# Patient Record
Sex: MALE | Race: WHITE | NOT HISPANIC OR LATINO | URBAN - METROPOLITAN AREA
[De-identification: names, ages, dates, MRNs, and addresses within clinical notes are randomized per-mention and may not be internally consistent; named-entity substitution may affect disease eponyms.]

---

## 2017-01-09 ENCOUNTER — OFFICE VISIT - HEALTHEAST (OUTPATIENT)
Dept: VASCULAR SURGERY | Facility: CLINIC | Age: 82
End: 2017-01-09

## 2017-01-09 DIAGNOSIS — I73.9 PAD (PERIPHERAL ARTERY DISEASE) (H): ICD-10-CM

## 2017-01-09 DIAGNOSIS — M79.604 LEG PAIN, BILATERAL: ICD-10-CM

## 2017-01-09 DIAGNOSIS — M79.605 LEG PAIN, BILATERAL: ICD-10-CM

## 2017-01-09 DIAGNOSIS — I89.0 ACQUIRED LYMPHEDEMA OF LEG: ICD-10-CM

## 2017-01-09 DIAGNOSIS — M79.89 SWELLING OF LIMB: ICD-10-CM

## 2017-07-05 ENCOUNTER — PRE VISIT (OUTPATIENT)
Dept: SURGERY | Facility: CLINIC | Age: 82
End: 2017-07-05

## 2017-07-05 NOTE — TELEPHONE ENCOUNTER
1.  Date/reason for appt: 7/18/17- Lesion spotted during colonoscopy     2.  Referring provider: Dr. Livan Galan ?     3.  Call to patient (Yes / No - short description): No -referred by [facility]     4.  Previous care at / records requested from:     1. [facility] - faxed cover sheet

## 2017-07-11 ASSESSMENT — ENCOUNTER SYMPTOMS
BACK PAIN: 1
NIGHT SWEATS: 0
ARTHRALGIAS: 0
FEVER: 0
PANIC: 0
POLYPHAGIA: 0
JOINT SWELLING: 0
CHILLS: 0
FATIGUE: 1
HALLUCINATIONS: 0
MYALGIAS: 0
POLYDIPSIA: 0
DECREASED APPETITE: 0
STIFFNESS: 0
NECK PAIN: 0
MUSCLE CRAMPS: 0
WEIGHT LOSS: 1
MUSCLE WEAKNESS: 0
DECREASED CONCENTRATION: 0
ALTERED TEMPERATURE REGULATION: 0
INCREASED ENERGY: 1
WEIGHT GAIN: 0
NERVOUS/ANXIOUS: 1
INSOMNIA: 0
DEPRESSION: 1

## 2017-07-18 ENCOUNTER — OFFICE VISIT (OUTPATIENT)
Dept: SURGERY | Facility: CLINIC | Age: 82
End: 2017-07-18

## 2017-07-18 VITALS
HEIGHT: 70 IN | OXYGEN SATURATION: 98 % | SYSTOLIC BLOOD PRESSURE: 117 MMHG | WEIGHT: 170.6 LBS | DIASTOLIC BLOOD PRESSURE: 78 MMHG | HEART RATE: 54 BPM | TEMPERATURE: 98.1 F | BODY MASS INDEX: 24.42 KG/M2

## 2017-07-18 DIAGNOSIS — I77.9 PERIPHERAL ARTERIAL OCCLUSIVE DISEASE (H): ICD-10-CM

## 2017-07-18 DIAGNOSIS — Z95.1 HISTORY OF CORONARY ARTERY BYPASS SURGERY: ICD-10-CM

## 2017-07-18 DIAGNOSIS — D12.2 ADENOMA OF ASCENDING COLON: Primary | ICD-10-CM

## 2017-07-18 DIAGNOSIS — I25.10 CHRONIC CORONARY ARTERY DISEASE: ICD-10-CM

## 2017-07-18 RX ORDER — CHLORAL HYDRATE 500 MG
2 CAPSULE ORAL
COMMUNITY

## 2017-07-18 ASSESSMENT — PAIN SCALES - GENERAL: PAINLEVEL: NO PAIN (0)

## 2017-07-18 NOTE — MR AVS SNAPSHOT
After Visit Summary   7/18/2017    Humza Arvizu    MRN: [id number]           Patient Information     Date Of Birth          1935        Visit Information        Provider Department      7/18/2017 10:30 AM Russell Rodrigues MD [facility] Colon and Rectal Surgery        Today's Diagnoses     Adenoma of ascending colon    -  1    History of coronary artery bypass surgery        Peripheral arterial occlusive disease (H)        Chronic coronary artery disease           Follow-ups after your visit        Who to contact     Please call your clinic at [phone] to:    Ask questions about your health    Make or cancel appointments    Discuss your medicines    Learn about your test results    Speak to your doctor   If you have compliments or concerns about an experience at your clinic, or if you wish to file a complaint, please contact [facility] Patient Relations at [phone] or email us at [email or web address]         Additional Information About Your Visit        MyChart Information     [employer] gives you secure access to your electronic health record. If you see a primary care provider, you can also send messages to your care team and make appointments. If you have questions, please call your primary care clinic.  If you do not have a primary care provider, please call [phone] and they will assist you.      "[employer] is an electronic gateway that provides easy, online access to your medical records. With "[employer] you can request a clinic appointment, read your test results, renew a prescription or communicate with your care team.     To access your existing account, please contact your [facility] or call [phone] for assistance.        Care EveryWhere ID     This is your Care EveryWhere ID. This could be used by other organizations to access your [place] medical records  [id number]        Your Vitals Were     Pulse  "Temperature Height Pulse Oximetry BMI (Body Mass Index)       54 98.1  F (36.7  C) (Oral) 1.778 m (5' 10\") 98% 24.48 kg/m2        Blood Pressure from Last 3 Encounters:   No data found for BP    Weight from Last 3 Encounters:   No data found for Wt              Today, you had the following     No orders found for display       Primary Care Provider    None Specified       No primary provider on file.        Equal Access to Services     [facility] : Hadii aad ku hadtirsoo Soomaali, waaxda luqadaha, qaybta kaalmada adeegyada, waxumesh idiin pedriton adesaúl sands ladoryn . So [facility] [phone].    ATENCIÓN: Si habla adrien, tiene a gleason disposición servicios gratuitos de asistencia lingüística. Llame al [phone].    We comply with applicable federal civil rights laws and [place] laws. We do not discriminate on the basis of race, color, national origin, age, disability sex, sexual orientation or gender identity.            Thank you!     Thank you for choosing [facility] COLON AND RECTAL SURGERY  for your care. Our goal is always to provide you with excellent care. Hearing back from our patients is one way we can continue to improve our services. Please take a few minutes to complete the written survey that you may receive in the mail after your visit with us. Thank you!             Your Updated Medication List - Protect others around you: Learn how to safely use, store and throw away your medicines at www.disposemymeds.org.          This list is accurate as of: 7/18/17 11:59 PM.  Always use your most recent med list.                   Brand Name Dispense Instructions for use Diagnosis    ATENOLOL PO      Take 25 mg by mouth    Chronic low back pain, unspecified back pain laterality, with sciatica presence unspecified       co-enzyme Q-10 50 MG capsule      Take 50 mg by mouth        Omega-3 1000 MG Caps      Take 2 g by mouth        PLAVIX PO      Take 75 mg by mouth    Chronic low back pain, unspecified back pain " laterality, with sciatica presence unspecified       Turmeric Curcumin 500 MG Caps      Take 1 tablet by mouth        vitamin D3 1000 UNITS Caps      Take 1,000 Units by mouth

## 2017-07-18 NOTE — PROGRESS NOTES
"Colon and Rectal Surgery Clinic Note      RE: Humza Arvizu  : 1935  STARLA: 2017      Mehdi is an extremely pleasant 82-year-old man seen in consultation for Dr. Cristina Krishnamurthy to discuss possible surgery for an unresectable ascending colon adenoma. This was initially diagnosed on screening colonoscopy. Dr. Krishnamurthy performed 2 attempts at ablation with APC but these were unsuccessful. The polyp site was marked with Rahel ink.    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 In addition, Mehdi underwent CABG ×3 for a \" maker\" lesion that was causing angina. He has been asymptomatic since his bypass.    We denies any bleeding disorders or bowel problems other than mild constipation. He has had a previous shoulder arthroscopy and had no difficulties with anesthesia. He is currently on Plavix and atenolol but is not on aspirin per the advice of his neurologist.    Social history: Mehdi is a retired  from [employer]. He is  but his wife is unfortunately currently hospitalized at [facility] with terminal breast cancer. He is a nonsmoker and very occasional drinker.    Family history: Negative for colorectal cancer. Please father had prostate cancer.    I had a detailed discussion with Mehdi regarding his options moving forward. Given the use of the APC, I doubt that he will be a candidate for endoscopic submucosal dissection, but I will at least get the opinion of our interventional endoscopists about this. We discussed that the traditional therapy for this nonresectable polyp would be a laparoscopic right hemicolectomy. The polyp measures only 11-15 mm in size so I don't think there is extreme urgency to proceed immediately given the status of Mehdi's wife. We discussed the risks associated with surgery in detail, including but not limited to bleeding, infection, deep venous thrombosis/pulmonary embolism, pneumonia, adjacent organ injury, " myocardial infarction, cerebrovascular accident, and death. We specifically discussed the risks of anastomotic failure and the full range of its potential serious consequences. Mehdi and I discussed that his risks for cardiac and cerebrovascular complications would be elevated based on his known atherosclerotic disease. I answered all of Mehdi's questions to his stated satisfaction. He expressed his understanding and agreement with the proposed plan. Given his wife's status, we agreed to meet again in several weeks to months when his home situation is more stable to recovery review these issues. We did discuss the alternative of nonoperative therapy, which is not entirely unreasonable based upon his potential cardiovascular and cerebrovascular morbidity, but overall my guess is that he is in reasonable shape to proceed with surgery and I did not advise nonoperative therapy. We will need to reinterrogate his cerebrovascular and possibly cardiovascular status prior to operation in any case.    Total time 45 minutes. Greater than half the time spent counseling.      For details of past medical history, surgical history, family history, medications, allergies, and review of systems, please see details below.    Medical history:  No past medical history on file.    Surgical history:  No past surgical history on file.    Family history:  No family history on file.    Medications:  Current Outpatient Prescriptions   Medication Sig Dispense Refill     Omega-3 1000 MG CAPS Take 2 g by mouth       Cholecalciferol (VITAMIN D3) 1000 UNITS CAPS Take 1,000 Units by mouth       co-enzyme Q-10 50 MG capsule Take 50 mg by mouth       Turmeric Curcumin 500 MG CAPS Take 1 tablet by mouth       Clopidogrel Bisulfate (PLAVIX PO) Take 75 mg by mouth       ATENOLOL PO Take 25 mg by mouth       Allergies:  The patientis allergic to bee pollen and pollen extract.    Social history:  Social History   Substance Use Topics     Smoking status:  "Never Smoker     Smokeless tobacco: Not on file     Alcohol use Not on file     Marital status: .    Review of Systems:  Nursing Notes:   Lucy Chen LPN  7/18/2017 10:37 AM  Signed  Chief Complaint   Patient presents with     Clinic Care Coordination - Initial     New pt consult to discuss lesion found on colonoscopy.        Vitals:    07/18/17 1034   BP: 117/78   BP Location: Left arm   Patient Position: Chair   Cuff Size: Adult Regular   Pulse: 54   Temp: 98.1  F (36.7  C)   TempSrc: Oral   SpO2: 98%   Weight: 170 lb 9.6 oz   Height: 5' 10\"       Body mass index is 24.48 kg/(m^2).  FORREST Braun MD   Professor and Chief  Division of Colon and Rectal Surgery  [facility]      Referring Provider:  Referred Self, MD  No address on file     Primary Care Provider:  No primary care provider on file.  "

## 2017-07-18 NOTE — NURSING NOTE
"Chief Complaint   Patient presents with     Clinic Care Coordination - Initial     New pt consult to discuss lesion found on colonoscopy.        Vitals:    07/18/17 1034   BP: 117/78   BP Location: Left arm   Patient Position: Chair   Cuff Size: Adult Regular   Pulse: 54   Temp: 98.1  F (36.7  C)   TempSrc: Oral   SpO2: 98%   Weight: 170 lb 9.6 oz   Height: 5' 10\"       Body mass index is 24.48 kg/(m^2).  Sulma DEMPSEY, GERBERN                        "

## 2017-07-18 NOTE — LETTER
"2017       RE: Humza Arvizu  [address] [id number]     Dear Mirian,    Thank you for referring your patient, Humza Arvizu, to the [facility] COLON AND RECTAL SURGERY at [facility]. Please see a copy of my visit note below.    Ricky        Colon and Rectal Surgery Clinic Note      RE: Humza Arvizu  : 1935  STARLA: 2017      Mehdi is an extremely pleasant 82-year-old man seen in consultation for Dr. Cristina Krishnamurthy to discuss possible surgery for an unresectable ascending colon adenoma. This was initially diagnosed on screening colonoscopy. Dr. Krishnamurthy performed 2 attempts at ablation with APC but these were unsuccessful. The polyp site was marked with Rahel ink.    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 In addition, Mehdi underwent CABG ×3 for a \" maker\" lesion that was causing angina. He has been asymptomatic since his bypass.    We denies any bleeding disorders or bowel problems other than mild constipation. He has had a previous shoulder arthroscopy and had no difficulties with anesthesia. He is currently on Plavix and atenolol but is not on aspirin per the advice of his neurologist.    Social history: Mehdi is a retired  from [employer]. He is  but his wife is unfortunately currently hospitalized at [facility] with terminal breast cancer. He is a nonsmoker and very occasional drinker.    Family history: Negative for colorectal cancer. Please father had prostate cancer.    I had a detailed discussion with Mehdi regarding his options moving forward. Given the use of the APC, I doubt that he will be a candidate for endoscopic submucosal dissection, but I will at least get the opinion of our interventional endoscopists about this. We discussed that the traditional therapy for this nonresectable polyp would be a laparoscopic right hemicolectomy. The polyp measures only " 11-15 mm in size so I don't think there is extreme urgency to proceed immediately given the status of Mehdi's wife. We discussed the risks associated with surgery in detail, including but not limited to bleeding, infection, deep venous thrombosis/pulmonary embolism, pneumonia, adjacent organ injury, myocardial infarction, cerebrovascular accident, and death. We specifically discussed the risks of anastomotic failure and the full range of its potential serious consequences. Mehdi and I discussed that his risks for cardiac and cerebrovascular complications would be elevated based on his known atherosclerotic disease. I answered all of Mehdi's questions to his stated satisfaction. He expressed his understanding and agreement with the proposed plan. Given his wife's status, we agreed to meet again in several weeks to months when his home situation is more stable to recovery review these issues. We did discuss the alternative of nonoperative therapy, which is not entirely unreasonable based upon his potential cardiovascular and cerebrovascular morbidity, but overall my guess is that he is in reasonable shape to proceed with surgery and I did not advise nonoperative therapy. We will need to reinterrogate his cerebrovascular and possibly cardiovascular status prior to operation in any case.    Total time 45 minutes. Greater than half the time spent counseling.      For details of past medical history, surgical history, family history, medications, allergies, and review of systems, please see details below.    Medical history:  No past medical history on file.    Surgical history:  No past surgical history on file.    Family history:  No family history on file.    Medications:  Current Outpatient Prescriptions   Medication Sig Dispense Refill     Omega-3 1000 MG CAPS Take 2 g by mouth       Cholecalciferol (VITAMIN D3) 1000 UNITS CAPS Take 1,000 Units by mouth       co-enzyme Q-10 50 MG capsule Take 50 mg by mouth       Turmeric  "Curcumin 500 MG CAPS Take 1 tablet by mouth       Clopidogrel Bisulfate (PLAVIX PO) Take 75 mg by mouth       ATENOLOL PO Take 25 mg by mouth       Allergies:  The patientis allergic to bee pollen and pollen extract.    Social history:  Social History   Substance Use Topics     Smoking status: Never Smoker     Smokeless tobacco: Not on file     Alcohol use Not on file     Marital status: .    Review of Systems:  Nursing Notes:   Lucy Chen LPN  7/18/2017 10:37 AM  Signed  Chief Complaint   Patient presents with     Clinic Care Coordination - Initial     New pt consult to discuss lesion found on colonoscopy.        Vitals:    07/18/17 1034   BP: 117/78   BP Location: Left arm   Patient Position: Chair   Cuff Size: Adult Regular   Pulse: 54   Temp: 98.1  F (36.7  C)   TempSrc: Oral   SpO2: 98%   Weight: 170 lb 9.6 oz   Height: 5' 10\"       Body mass index is 24.48 kg/(m^2).  Zong X, LPN                               Russell Rodrigues MD   Professor and Chief  Division of Colon and Rectal Surgery  [facility]      Referring Provider:  Referred Self, MD  No address on file     Primary Care Provider:  No primary care provider on file.    Again, thank you for allowing me to participate in the care of your patient.      Sincerely,    Russell Rodrigues MD      "

## 2017-08-10 ENCOUNTER — CARE COORDINATION (OUTPATIENT)
Dept: SURGERY | Facility: CLINIC | Age: 82
End: 2017-08-10

## 2017-08-15 ENCOUNTER — TELEPHONE (OUTPATIENT)
Dept: SURGERY | Facility: CLINIC | Age: 82
End: 2017-08-15

## 2017-08-15 ENCOUNTER — CARE COORDINATION (OUTPATIENT)
Dept: SURGERY | Facility: CLINIC | Age: 82
End: 2017-08-15

## 2017-08-15 NOTE — PROGRESS NOTES
Informed pt that Onelia Rodrigues and Chauncey have spoken and it is felt that his polyp is not amenable to endoscopic resection.  Let him know that Dr. Rodrigues would like to pt to come back in to discuss surgical options.  Pt states agreement.

## 2017-08-22 ENCOUNTER — OFFICE VISIT (OUTPATIENT)
Dept: SURGERY | Facility: CLINIC | Age: 82
End: 2017-08-22

## 2017-08-22 VITALS
HEIGHT: 70 IN | HEART RATE: 77 BPM | SYSTOLIC BLOOD PRESSURE: 142 MMHG | WEIGHT: 173.7 LBS | DIASTOLIC BLOOD PRESSURE: 77 MMHG | BODY MASS INDEX: 24.87 KG/M2 | TEMPERATURE: 98.3 F | OXYGEN SATURATION: 100 %

## 2017-08-22 DIAGNOSIS — I77.9 PERIPHERAL ARTERIAL OCCLUSIVE DISEASE (H): ICD-10-CM

## 2017-08-22 DIAGNOSIS — Z95.1 HISTORY OF CORONARY ARTERY BYPASS SURGERY: ICD-10-CM

## 2017-08-22 DIAGNOSIS — D12.2 ADENOMA OF ASCENDING COLON: Primary | ICD-10-CM

## 2017-08-22 RX ORDER — METOPROLOL SUCCINATE 25 MG/1
25 TABLET, EXTENDED RELEASE ORAL
COMMUNITY
Start: 2017-07-20 | End: 2018-07-20

## 2017-08-22 ASSESSMENT — PAIN SCALES - GENERAL: PAINLEVEL: NO PAIN (0)

## 2017-08-22 NOTE — NURSING NOTE
"Chief Complaint   Patient presents with     Clinic Care Coordination - Follow-up     Pt here to discuss surgery regarding removal of polyp.       Vitals:    08/22/17 1026   BP: 142/77   BP Location: Left arm   Patient Position: Chair   Cuff Size: Adult Regular   Pulse: 77   Temp: 98.3  F (36.8  C)   TempSrc: Oral   SpO2: 100%   Weight: 173 lb 11.2 oz   Height: 5' 10\"       Body mass index is 24.92 kg/(m^2).    Sulma DEMPSEY LPN                        "

## 2017-08-22 NOTE — PROGRESS NOTES
Colon and Rectal Surgery Clinic Note      RE: Humza Arvizu  : 1935  STARLA: 2017    Mehdi returns today with his daughter Tessa Low to discuss further therapy of an ascending colon adenoma that is failed APC therapy ×2. I last saw him on 2017 and refer you to that note for details. At that time, his wife was in the process of dying of breast cancer, and she has now passed away. I had referred Mehdi to Dr. Grossman to consider possible advanced endoscopy removal of the 11-15 mm polyp, but Dr. Grossman felt that the patient was not a candidate for this due to his prior APC therapy.    We had a quite detailed discussion about the range of options moving forward. Given this endoscopically unresectable polyp, my recommendation was left per scopic right hemicolectomy.We discussed the medical and surgical risks associated with the procedure, including but not limited to bleeding, infection, adjacent organ injury, myocardial infarction, cerebrovascular accident, deep venous thrombosis, pulmonary embolism, pneumonia, anastomotic leak with its attendant and sometimes severe consequences, and death. We specifically discussed Mehdi's particular risks of stroke related to his basilar artery occlusion that has been stented, and the need to reevaluate his cerebral vasculature she decides to proceed with surgery. We discussed the options of seeking another opinion from a different advanced endoscopist, and I specifically discussed seeking an opinion at the [facility], where there was a large advanced endoscopy group. We discussed the ongoing risk of developing a cancer in this polyp, and the fact that the time course of the cancer developing was uncertain, though I opined that the near term risk was probably relatively low given the relative small size of the polyp. Mehdi is very surgery averse and is considering simply observing the lesion. Should he decide upon that course, he should have another colonoscopy in the  relative near term.    We left it that lead would try to make a decision about his overall plan within the next several weeks or months, and I urged him strongly to seek another endoscopy opinion as he does not want surgery if it's at all avoidable. We had a prolonged family discussion. I answered all of Mehdi and his family's questions to their stated satisfaction. They expressed her understanding and Mehdi will let me know if he wants to proceed with surgery or repeat endoscopy here.    Total time 40 minutes. Greater than half the time was spent counseling.        For details of past medical history, surgical history, family history, medications, allergies, and review of systems, please see details below.    Medical history:  No past medical history on file.    Surgical history:  No past surgical history on file.    Family history:  No family history on file.    Medications:  Current Outpatient Prescriptions   Medication Sig Dispense Refill     metoprolol (TOPROL-XL) 25 MG 24 hr tablet Take 25 mg by mouth       Omega-3 1000 MG CAPS Take 2 g by mouth       Cholecalciferol (VITAMIN D3) 1000 UNITS CAPS Take 1,000 Units by mouth       co-enzyme Q-10 50 MG capsule Take 50 mg by mouth       Turmeric Curcumin 500 MG CAPS Take 1 tablet by mouth       Clopidogrel Bisulfate (PLAVIX PO) Take 75 mg by mouth       Allergies:  The patientis allergic to bee pollen and pollen extract.    Social history:  Social History   Substance Use Topics     Smoking status: Never Smoker     Smokeless tobacco: Not on file     Alcohol use Not on file     Marital status: .    Review of Systems:  Nursing Notes:   Lucy Chen LPN  8/22/2017 10:29 AM  Signed  Chief Complaint   Patient presents with     Clinic Care Coordination - Follow-up     Pt here to discuss surgery regarding removal of polyp.       Vitals:    08/22/17 1026   BP: 142/77   BP Location: Left arm   Patient Position: Chair   Cuff Size: Adult Regular   Pulse: 77   Temp: 98.3  " F (36.8  C)   TempSrc: Oral   SpO2: 100%   Weight: 173 lb 11.2 oz   Height: 5' 10\"       Body mass index is 24.92 kg/(m^2).    FORREST Braun MD   Professor and Chief  Division of Colon and Rectal Surgery  [facility]      Referring Provider:  ESTABLISHED PATIENT  No address on file     Primary Care Provider:  No primary care provider on file.  "

## 2017-08-22 NOTE — MR AVS SNAPSHOT
After Visit Summary   8/22/2017    Humza Arvizu    MRN: [id number]           Patient Information     Date Of Birth          1935        Visit Information        Provider Department      8/22/2017 10:30 AM Russell Rodrigues MD [facility]        Today's Diagnoses     Adenoma of ascending colon    -  1    History of coronary artery bypass surgery        Peripheral arterial occlusive disease (H)           Follow-ups after your visit        Who to contact     Please call your clinic at [phone] to:    Ask questions about your health    Make or cancel appointments    Discuss your medicines    Learn about your test results    Speak to your doctor   If you have compliments or concerns about an experience at your clinic, or if you wish to file a complaint, please contact [facility] Patient Relations at [phone] or email us at [email or web address]         Additional Information About Your Visit        MyChart Information     [employer] gives you secure access to your electronic health record. If you see a primary care provider, you can also send messages to your care team and make appointments. If you have questions, please call your primary care clinic.  If you do not have a primary care provider, please call [phone] and they will assist you.      [employer] is an electronic gateway that provides easy, online access to your medical records. With [employer], you can request a clinic appointment, read your test results, renew a prescription or communicate with your care team.     To access your existing account, please contact your [facility] or call [phone] for assistance.        Care EveryWhere ID     This is your Care EveryWhere ID. This could be used by other organizations to access your [facility] medical records  [id number]        Your Vitals Were     Pulse Temperature Height Pulse Oximetry BMI (Body  "Mass Index)       77 98.3  F (36.8  C) (Oral) 5' 10\" 100% 24.92 kg/m2        Blood Pressure from Last 3 Encounters:   08/22/17 142/77   07/18/17 117/78   08/25/16 124/67    Weight from Last 3 Encounters:   08/22/17 173 lb 11.2 oz   07/18/17 170 lb 9.6 oz   08/25/16 187 lb 9.6 oz              Today, you had the following     No orders found for display       Primary Care Provider    None Specified       No primary provider on file.        Equal Access to Services     : Hadii jesus Carranza, waeagle pereira, qaragini kaalmaannabelle milan, margarette stinson . So [facility] [phone].    ATENCIÓN: Si habla español, tiene a gleason disposición servicios gratuitos de asistencia lingüística. Llame al [phone].    We comply with applicable federal civil rights laws and [place] laws. We do not discriminate on the basis of race, color, national origin, age, disability sex, sexual orientation or gender identity.            Thank you!     Thank you for choosing [facility]  for your care. Our goal is always to provide you with excellent care. Hearing back from our patients is one way we can continue to improve our services. Please take a few minutes to complete the written survey that you may receive in the mail after your visit with us. Thank you!             Your Updated Medication List - Protect others around you: Learn how to safely use, store and throw away your medicines at www.disposemymeds.org.          This list is accurate as of: 8/22/17 11:37 AM.  Always use your most recent med list.                   Brand Name Dispense Instructions for use Diagnosis    co-enzyme Q-10 50 MG capsule      Take 50 mg by mouth        metoprolol 25 MG 24 hr tablet    TOPROL-XL     Take 25 mg by mouth        Omega-3 1000 MG Caps      Take 2 g by mouth        PLAVIX PO      Take 75 mg by mouth    Chronic low back pain, unspecified back pain laterality, with sciatica presence " unspecified       Turmeric Curcumin 500 MG Caps      Take 1 tablet by mouth        vitamin D3 1000 UNITS Caps      Take 1,000 Units by mouth

## 2017-08-24 ENCOUNTER — CARE COORDINATION (OUTPATIENT)
Dept: GASTROENTEROLOGY | Facility: CLINIC | Age: 82
End: 2017-08-24

## 2017-08-24 NOTE — PROGRESS NOTES
[facility] G[facility]ology Procedure Referral       Referring provider: Dr. Rodrigues [facility] Colorectal     Referred to: [facility] - Specific provider: Dr. Chauncey Clarke     Referral Received: 8/23/17    Records received: 08/24/17    Images received: ADI SCHRADER review date: 08/24/17    Requested procedure: Colonoscopy with possible ESD vs EMR     Evaluation for: Endoscopic resection of previously resected colon polyp.       Clinical History (per RN review of records provided):     - Patient found to have a lesion during screening colonoscopy. Initial colonoscopy report and first attempt at resection completed on 02/13/2013.     - Colonoscopy Findings: 2/13/2013  Polyp location: Cecum. Quantity: 1. Size: 3 mm. Polyp shape: Sessile - Removed completely with cold snare.   Polyp location: Ascending colon. Quantity: 1. Size: 6 mm. Polyp shape: Flat lesion. - Partial polypectomy with hot snare and APC.   Diverticulosis - Location: descending colon - sigmoid. Description: moderate. Size: medium. Quantity: Several, no inflammation present.   Anal canal: Normal   Remainder of exam normal.     Pathology 2/13/2013:   Diagnosis:   Colon,cecum polyp:   1. Tubular adenoma   2. No evidence of high grade dysplasia     Colon, ascending polyp:   1. Tubular adenoma   2. No evidence of high grade dysplasia       - Repeat Colonoscopy completed on 1/17/2014.     Colonoscopy findings: 1/17/2014:   Polyp Location: Ascending colon. Quantity: 1. Size: 5 mm . Polyp shape: Flat lesion. Maneuver: fulguration was performed with a APC.   Residual polyp from previous treatment.   Diverticulosis - Location: descending colon - sigmoid. Description: moderate. Size: small. Quantity: Several, no inflammation present.   Anal canal: Normal     Colonoscopy findings - 12/16/2016:  Polyp Location: ascending colon - proximal. Quantity: 1. Size: 11-15 mm. Polyp shape: Flat lesion. Maneuver: Fulguration as performed with a APC  and site was marked with shireen ink tattoo. No removal completed.   This is the same polyp seen last year and treated with APC.   Diverticulosis - Location: descending colon - sigmoid. Description: moderate. Size: medium. Quantity: Several, no inflammation present.   Anal Canal - external hemorrhoids.     Recommendations/Orders:     - Per review with Dr. Grossman, willing to repeat Colonoscopy for possible advanced polypectomy but d/t previous polyp manipulation with APC x3, partial polypectomy and tattoo it will be a technically difficult procedure. May not be able to remove polyp endoscopically d/t this. As long as patient is aware of this and understands that repeat procedure may not result in removal ok to schedule for procedure. Per discussion with Dr. Grossman, Dr. Rodrigues was to discuss with patient and ensure he was agreeable to repeating procedure.      If patient is agreeable to repeat procedure with Dr. Grossman will schedule for first available colonoscopy with possible ESD in the OR. PT will need H&P prior to procedure.       Referral updates/Patient contacted:       Dr. Rodrigues to reach out to patient to discuss and let our office know if pt will proceed with procedure.       Received note from Dr. Rodrigues that patient is set up for outside opinion. He will call their office if he would like to arrange for procedure here.

## 2017-08-28 ENCOUNTER — TELEPHONE (OUTPATIENT)
Dept: SURGERY | Facility: CLINIC | Age: 82
End: 2017-08-28

## 2017-08-28 NOTE — TELEPHONE ENCOUNTER
Spoke with Mehdi by phone today and conveyed that Dr. Grossman would be happy to look at the lesion colonoscopically (no promise of resection attempt depending upon findings).  Pt has already set up a consultation outside the [facility].  He will let us know what he decides after that.

## 2018-01-19 ENCOUNTER — AMBULATORY - HEALTHEAST (OUTPATIENT)
Dept: CARDIOLOGY | Facility: CLINIC | Age: 83
End: 2018-01-19

## 2018-01-23 ENCOUNTER — OFFICE VISIT - HEALTHEAST (OUTPATIENT)
Dept: CARDIOLOGY | Facility: CLINIC | Age: 83
End: 2018-01-23

## 2018-01-23 DIAGNOSIS — Z95.1 S/P CABG (CORONARY ARTERY BYPASS GRAFT): ICD-10-CM

## 2018-01-23 DIAGNOSIS — I25.83 CORONARY ATHEROSCLEROSIS DUE TO LIPID RICH PLAQUE: ICD-10-CM

## 2018-01-23 DIAGNOSIS — E78.00 HYPERCHOLESTEREMIA: ICD-10-CM

## 2018-01-23 DIAGNOSIS — E87.1 HYPONATREMIA: ICD-10-CM

## 2018-01-23 DIAGNOSIS — I10 ESSENTIAL HYPERTENSION: ICD-10-CM

## 2018-01-23 ASSESSMENT — MIFFLIN-ST. JEOR: SCORE: 1439.34

## 2019-08-19 ENCOUNTER — OFFICE VISIT - HEALTHEAST (OUTPATIENT)
Dept: VASCULAR SURGERY | Facility: CLINIC | Age: 84
End: 2019-08-19

## 2019-08-19 ENCOUNTER — RECORDS - HEALTHEAST (OUTPATIENT)
Dept: VASCULAR ULTRASOUND | Facility: CLINIC | Age: 84
End: 2019-08-19

## 2019-08-19 DIAGNOSIS — M79.604 RIGHT LEG PAIN: ICD-10-CM

## 2019-08-19 DIAGNOSIS — M79.604 PAIN IN RIGHT LEG: ICD-10-CM

## 2019-08-19 DIAGNOSIS — M79.89 OTHER SPECIFIED SOFT TISSUE DISORDERS: ICD-10-CM

## 2019-08-19 DIAGNOSIS — I73.9 PAD (PERIPHERAL ARTERY DISEASE) (H): ICD-10-CM

## 2019-08-19 DIAGNOSIS — I89.0 ACQUIRED LYMPHEDEMA OF LEG: ICD-10-CM

## 2019-08-19 DIAGNOSIS — I73.9 PERIPHERAL VASCULAR DISEASE, UNSPECIFIED (H): ICD-10-CM

## 2019-08-19 DIAGNOSIS — M79.89 SWELLING OF LIMB: ICD-10-CM

## 2019-08-19 DIAGNOSIS — I89.0 LYMPHEDEMA, NOT ELSEWHERE CLASSIFIED: ICD-10-CM

## 2019-08-19 ASSESSMENT — MIFFLIN-ST. JEOR: SCORE: 1401.7

## 2019-08-20 ENCOUNTER — COMMUNICATION - HEALTHEAST (OUTPATIENT)
Dept: VASCULAR SURGERY | Facility: CLINIC | Age: 84
End: 2019-08-20

## 2019-08-28 ENCOUNTER — AMBULATORY - HEALTHEAST (OUTPATIENT)
Dept: VASCULAR SURGERY | Facility: CLINIC | Age: 84
End: 2019-08-28

## 2019-08-28 DIAGNOSIS — S82.90XK: ICD-10-CM

## 2019-08-28 DIAGNOSIS — M79.604 RIGHT LEG PAIN: ICD-10-CM

## 2019-08-29 ENCOUNTER — COMMUNICATION - HEALTHEAST (OUTPATIENT)
Dept: VASCULAR SURGERY | Facility: CLINIC | Age: 84
End: 2019-08-29

## 2019-09-19 ENCOUNTER — RECORDS - HEALTHEAST (OUTPATIENT)
Dept: ADMINISTRATIVE | Facility: OTHER | Age: 84
End: 2019-09-19

## 2019-10-10 ENCOUNTER — RECORDS - HEALTHEAST (OUTPATIENT)
Dept: ADMINISTRATIVE | Facility: OTHER | Age: 84
End: 2019-10-10

## 2019-11-06 ENCOUNTER — HEALTH MAINTENANCE LETTER (OUTPATIENT)
Age: 84
End: 2019-11-06

## 2019-12-19 ENCOUNTER — OFFICE VISIT - HEALTHEAST (OUTPATIENT)
Dept: VASCULAR SURGERY | Facility: CLINIC | Age: 84
End: 2019-12-19

## 2019-12-19 DIAGNOSIS — M79.89 SWELLING OF LIMB: ICD-10-CM

## 2019-12-19 DIAGNOSIS — S82.90XK: ICD-10-CM

## 2019-12-19 DIAGNOSIS — I89.0 ACQUIRED LYMPHEDEMA OF LEG: ICD-10-CM

## 2019-12-19 DIAGNOSIS — M79.604 RIGHT LEG PAIN: ICD-10-CM

## 2019-12-19 DIAGNOSIS — I73.9 PAD (PERIPHERAL ARTERY DISEASE) (H): ICD-10-CM

## 2019-12-19 ASSESSMENT — MIFFLIN-ST. JEOR: SCORE: 1398.97

## 2020-02-16 ENCOUNTER — HEALTH MAINTENANCE LETTER (OUTPATIENT)
Age: 85
End: 2020-02-16

## 2020-06-08 ENCOUNTER — COMMUNICATION - HEALTHEAST (OUTPATIENT)
Dept: VASCULAR SURGERY | Facility: CLINIC | Age: 85
End: 2020-06-08

## 2020-06-18 ENCOUNTER — OFFICE VISIT - HEALTHEAST (OUTPATIENT)
Dept: VASCULAR SURGERY | Facility: CLINIC | Age: 85
End: 2020-06-18

## 2020-06-18 DIAGNOSIS — I87.303 VENOUS HYPERTENSION OF LOWER EXTREMITY, BILATERAL: ICD-10-CM

## 2020-06-18 DIAGNOSIS — I89.0 ACQUIRED LYMPHEDEMA OF LEG: ICD-10-CM

## 2020-06-18 DIAGNOSIS — M79.89 SWELLING OF LIMB: ICD-10-CM

## 2020-06-18 DIAGNOSIS — M79.604 RIGHT LEG PAIN: ICD-10-CM

## 2020-06-18 DIAGNOSIS — I73.9 PAD (PERIPHERAL ARTERY DISEASE) (H): ICD-10-CM

## 2020-06-18 DIAGNOSIS — S82.90XK: ICD-10-CM

## 2020-09-21 ENCOUNTER — AMBULATORY - HEALTHEAST (OUTPATIENT)
Dept: VASCULAR SURGERY | Facility: CLINIC | Age: 85
End: 2020-09-21

## 2020-09-21 DIAGNOSIS — I73.9 PAD (PERIPHERAL ARTERY DISEASE) (H): ICD-10-CM

## 2020-10-01 ENCOUNTER — RECORDS - HEALTHEAST (OUTPATIENT)
Dept: VASCULAR ULTRASOUND | Facility: CLINIC | Age: 85
End: 2020-10-01

## 2020-10-01 DIAGNOSIS — I73.9 PERIPHERAL VASCULAR DISEASE, UNSPECIFIED (H): ICD-10-CM

## 2020-10-02 ENCOUNTER — COMMUNICATION - HEALTHEAST (OUTPATIENT)
Dept: VASCULAR SURGERY | Facility: CLINIC | Age: 85
End: 2020-10-02

## 2020-10-19 ENCOUNTER — OFFICE VISIT - HEALTHEAST (OUTPATIENT)
Dept: VASCULAR SURGERY | Facility: CLINIC | Age: 85
End: 2020-10-19

## 2020-10-19 DIAGNOSIS — S82.90XK: ICD-10-CM

## 2020-10-19 DIAGNOSIS — I87.303 VENOUS HYPERTENSION OF LOWER EXTREMITY, BILATERAL: ICD-10-CM

## 2020-10-19 DIAGNOSIS — M79.89 SWELLING OF LIMB: ICD-10-CM

## 2020-10-19 DIAGNOSIS — R97.20 ELEVATED PSA: ICD-10-CM

## 2020-10-19 DIAGNOSIS — I89.0 ACQUIRED LYMPHEDEMA OF LEG: ICD-10-CM

## 2020-10-19 DIAGNOSIS — I73.9 PAD (PERIPHERAL ARTERY DISEASE) (H): ICD-10-CM

## 2020-10-19 ASSESSMENT — MIFFLIN-ST. JEOR: SCORE: 1409.07

## 2020-11-29 ENCOUNTER — HEALTH MAINTENANCE LETTER (OUTPATIENT)
Age: 85
End: 2020-11-29

## 2021-04-10 ENCOUNTER — HEALTH MAINTENANCE LETTER (OUTPATIENT)
Age: 86
End: 2021-04-10

## 2021-05-25 ENCOUNTER — RECORDS - HEALTHEAST (OUTPATIENT)
Dept: ADMINISTRATIVE | Facility: CLINIC | Age: 86
End: 2021-05-25

## 2021-05-29 ENCOUNTER — RECORDS - HEALTHEAST (OUTPATIENT)
Dept: ADMINISTRATIVE | Facility: CLINIC | Age: 86
End: 2021-05-29

## 2021-05-31 ENCOUNTER — RECORDS - HEALTHEAST (OUTPATIENT)
Dept: ADMINISTRATIVE | Facility: CLINIC | Age: 86
End: 2021-05-31

## 2021-05-31 VITALS — HEIGHT: 70 IN | WEIGHT: 165.8 LBS | BODY MASS INDEX: 23.74 KG/M2

## 2021-05-31 NOTE — TELEPHONE ENCOUNTER
Called patient regarding x-ray and US results, no answer, left a message to call the vascular clinic

## 2021-05-31 NOTE — PROGRESS NOTES
Not wearing compression in summer. States he has been wearing them until the weather became too warm.

## 2021-05-31 NOTE — PROGRESS NOTES
jDate of Service: 08/19/19    Date last seen:  01/09/2017    PCP: Asha Ng MD     Impression:   1. Leg swelling and pain  2. Right lateral upper calf pain  3. Secondary acquired lymphedema of legs  4. Hyperkeratosis of skin of toes.  5. Known history of PAD     Plan:   1. Questions were answered.   2. Knows self massage and does when needed.  Continue compression.  3. Still needs TEMO's rechecked.  4. XR right leg to check status of old fracture with increased pinpoint pain.  5. Patient will follow up in 4 months, or when needed.     Time spent with patient 25 minutes with greater than 50% time in consultation, education and coordination of care.   ---------------------------------------------------------------------------------------------------------------------     Chief Complaint: Bilateral leg swelling     History of Present Illness:   Humza Arvizu returns to the [facility] for follow up of bilateral leg swelling due to secondary acquired lymphedema, hyperkeratosis and known underlying PAD.  Treatment included education, range of motion work and exercise.  I last saw him January 2017.  At that time he was having more aching in his legs.  ABIs were rechecked with a history of PAD.  He was to continue using his compression and doing self massage when he needed it.  He was also to get back to doing range of motion of his ankles and his exercise program.  He was to update his stockings regularly.  He was to follow-up in 3 months but was lost to follow-up.  I do not see that he had the TEMO done.  From his visit with Dr. Barron on 5/2/2019 I note that he was complaining about waking up in the mornings now with pain in his right lower leg.  This would get better as the day went on.  He was being treated with Lamisil for toenail infection of fungus by the dermatologist.  Since I last saw him he has lost 30 pounds since his wife past 2 years ago.  He stopped wearing  "compression in May.  He is feeling more tightness in his feet. He continues to get swelling in the morning in the feet. He had a severe fracture in the right leg in his twenties which was never \"set\".  The right leg has been bothering him more in the lateral upper calf.  There has been no new numbness, tingling, weakness, masses, rashes, shortness of breath or chest pain. There have not been any new areas of ulceration. There has been no new fevers. There are no new or changing skin lesions.  He is in grief counseling to deal with his wife's death.  He has not been eating as much and he has not been getting out as much.  He has not been doing his regular exercise program the way he was before.      Past Medical History:   Diagnosis Date     Coronary Artery Disease     Created by Conversion      Dyslipidemia     Created by Conversion      Hypertension     Created by Conversion        Past Surgical History:   Procedure Laterality Date     CARDIAC CATHETERIZATION       CORONARY ARTERY BYPASS GRAFT       WI CABG, VEIN, SINGLE      Description: CABG (CABG);  Proc Date: 09/24/2010;  Annotations: LIMA to LAD, SVG to Intermediate, SVG to Cx     WI CABG, VEIN, SINGLE      Description: CABG (CABG);  Proc Date: 09/24/2010;  Annotations: LIMA to LAD, SVG to Intermediate, SVG to Cx         Current Outpatient Medications:      atenolol (TENORMIN) 25 MG tablet, Take 25 mg by mouth daily. , Disp: , Rfl:      cholecalciferol, vitamin D3, (VITAMIN D3) 1,000 unit capsule, Take 1,000 Units by mouth daily., Disp: , Rfl:      clopidogrel (PLAVIX) 75 mg tablet, Take 75 mg by mouth daily. , Disp: , Rfl:      co-enzyme Q-10 50 mg capsule, Take 50 mg by mouth daily., Disp: , Rfl:      folic acid (FOLVITE) 800 MCG tablet, Take 1 tablet by mouth., Disp: , Rfl:      magnesium oxide 250 mg Tab, Take by mouth., Disp: , Rfl:      OMEGA-3/DHA/EPA/FISH OIL (FISH OIL-OMEGA-3 FATTY ACIDS) 300-1,000 mg capsule, Take 2 g by mouth daily., Disp: , Rfl: "      TURMERIC ROOT EXTRACT ORAL, Take 1 tablet by mouth daily., Disp: , Rfl:     Allergies   Allergen Reactions     Bee Pollen Other (See Comments)     Spring pollens       Social History     Socioeconomic History     Marital status:      Spouse name: Not on file     Number of children: Not on file     Years of education: Not on file     Highest education level: Not on file   Occupational History     Not on file   Social Needs     Financial resource strain: Not on file     Food insecurity:     Worry: Not on file     Inability: Not on file     Transportation needs:     Medical: Not on file     Non-medical: Not on file   Tobacco Use     Smoking status: Never Smoker     Smokeless tobacco: Never Used   Substance and Sexual Activity     Alcohol use: Yes     Alcohol/week: 1.5 oz     Types: 3 Standard drinks or equivalent per week     Drug use: No     Sexual activity: Yes     Partners: Female     Birth control/protection: Post-menopausal   Lifestyle     Physical activity:     Days per week: Not on file     Minutes per session: Not on file     Stress: Not on file   Relationships     Social connections:     Talks on phone: Not on file     Gets together: Not on file     Attends Sabianist service: Not on file     Active member of club or organization: Not on file     Attends meetings of clubs or organizations: Not on file     Relationship status: Not on file     Intimate partner violence:     Fear of current or ex partner: Not on file     Emotionally abused: Not on file     Physically abused: Not on file     Forced sexual activity: Not on file   Other Topics Concern     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  Lives in house.        Family History   Problem Relation Age of Onset     Arthritis Mother      Heart disease Mother      Heart disease Father      Stroke Sister      Heart disease Sister      Macular degeneration Sister      Heart  attack Maternal Uncle      Heart disease Brother      Diabetes Brother      No Medical Problems Daughter      No Medical Problems Son      No Medical Problems Sister      Review of Systems:    Humza Arvizu no new numbess, tingling or weakness, redness or rashes, fevers, new masses, abdominal bloating or discomfort, unexplained weight loss, increased pain, new ulcers, shortness of breath and chest pain  Full 12 point review of systems was completed.    Imaging:    I personally reviewed the following imaging today and those on care everywhere, if indicated    EXAM: ULTRASOUND ABDOMEN COMPLETE  LOCATION: [facility]  DATE/TIME: 05/08/2019, 11:09 AM    INDICATION: Elevated bilirubin level, weight loss in the last year.  COMPARISON: None.    FINDINGS:  GALLBLADDER: The gallbladder is normal in appearance without evidence of wall thickening or stones. The patient was not focally tender over the gallbladder. No pericholecystic fluid was seen.  BILE DUCTS: No intrahepatic biliary ductal dilatation was seen. The common bile duct measures 6 mm.  LIVER: There are multiple cysts within the liver with the largest measuring 2.9 cm in greatest dimension. There is hepatopetal flow in the portal vein.  RIGHT KIDNEY: The right kidney measures 11.5 cm. There is normal renal echotexture with no evidence for hydronephrosis, focal mass, or shadowing stone.  LEFT KIDNEY: The left kidney measures 11.3 cm. There is normal renal echotexture with no evidence for hydronephrosis, focal mass, or shadowing stone.  SPLEEN: The spleen measures 9.8 cm.  PANCREAS: The visualized portions of the pancreas are normal.  AORTA: There is ectasia of the infrarenal abdominal aorta measuring 2.3 x 2.6 cm in size.  IVC: The visualized portions of the IVC are patent.    No ascites is seen.    CONCLUSION:  1.  Multiple hepatic cysts.  2.  No biliary dilatation.  3.  Ectasia of the infrarenal abdominal aorta.    Labs:    I personally reviewed the  following labs today and those on care everywhere, if indicated    No results found for: SEDRATE      No results found for: CRP        Lab Results   Component Value Date    CREATININE 0.84 09/28/2010      No results found for: HGBA1C        Lab Results   Component Value Date    BUN 18 09/28/2010              No results found for: LABPROT, ALBUMIN    No results found for: [id number]    No results found for: TSH  Lab Results   Component Value Date    WBC 5.8 09/21/2010    HGB 10.5 (L) 09/27/2010    HCT 46.7 09/21/2010    MCV 89 09/21/2010     (L) 09/28/2010     5/3/2019 TSH 1.39 magnesium 2.5 AST 16 ALT 14 total protein 6.7 albumin 3.9 bilirubin total 1.7 alk phos 61 BUN 22 creatinine 0.96 GFR greater than 60 hemoglobin A1c 6 PSA 3    Physical Exam:  Vitals:    08/19/19 0943   BP: 126/62   Pulse: (!) 58   Resp: 18   Temp: 97.9  F (36.6  C)      BMI 22.76 weight 157 pounds    Circumferential measures:    Vasc Edema 2/23/2015 8/24/2015 5/19/2016 1/9/2017 8/19/2019   Right just above MTP 24.6 23.5  24.5 23.1 23.7   Right Ankle 22.8 22.0 24 21.4 21.7   Right Widest Calf 36.6 35.5 36.5 37.2 34.6   Right Thigh Up 10cm 41.8 41.0 42.5 43.3 39.8   Left - just above MTP 24.5 23.3 28 22.8 24   Left Ankle 22.0 22.1 23 21.6 21.5   Left Widest Calf 37.5 35 37 36.1 35   Left Thigh Up 10cm 42.8 42 42.5 41.3 40.6     Circumferential numbers look good.    General:  84 y.o. male in no apparent distress.     Psych: Alert and oriented x 3.  Cooperative. Affect normal.      HEENT:  Atraumatic and normocephalic.    Abdominal: Normal bowel sounds without any pain, guarding,masses or rigidity. No inguinal lymphadenopathy palpated.    Musculoskeletal:  Normal range of motion in knees and ankles bilaterally throughout .  There is no active joint synovitis, erythema, swelling or joint laxity.  SLR negative bilaterally.  Pain to palpation in lateral upper right calf along fibula.    Neurological:  Sensation is slightly decreased to  pinprick and light touch in the feet bilaterally. Table.  Strength testing is normal in hip flexion, knee flexion, knee extension, ankle dorsiflexion and great toe extension bilaterally.      Vascular: Foot pulses hard to palpate. There are no significant telangietasias, medial ankle venous flares, venous varicosities  and spider veins . Dependent rubor.     Integumentary: Skin of the legs is uniformly warm and dry.  Nails are thickened.     Tennille Severino MD, ABWMS, FACCWS, [facility]  Medical Director Wound Care and Lymphedema  [facility]  [phone]

## 2021-05-31 NOTE — TELEPHONE ENCOUNTER
----- Message from Tennille Severino MD sent at 8/19/2019  5:03 PM CDT -----  Please let Mr. Arvizu know his arterial study looks stable.  He should continue the plan of care as outlined at his visit.

## 2021-05-31 NOTE — TELEPHONE ENCOUNTER
----- Message from Tennille Severino MD sent at 8/19/2019  5:08 PM CDT -----  Please let Mr. Arvizu know the x-ray did show there is some nonunion area or nonhealing along the site of his old fibular fracture.  If this continues to be painful and increases we should send him to orthopedic surgery for evaluation.  If he would like me to refer him he should let me know and I can do so.  If he has an orthopedic surgeon he has seen in the past that he has worked well with, I would recommend he return to them.  Please let me know.

## 2021-05-31 NOTE — TELEPHONE ENCOUNTER
Pt called requesting his recent x-ray and US results. Writer reminded them that they were given to him on 8/20/19. He wanted them again. He believes the TEMO caused the fracture that was seen in the x-ray. Writer explained that an old fracture was found on his x-ray and Dr. Severino recommended him to be seen by an orthopedic surgeon. He would like the referral for an orthopedic surgeon. Please call pt.

## 2021-05-31 NOTE — TELEPHONE ENCOUNTER
Called patient to let him know Dr. Severino referred him to see Dr. Ivey at [facility], and referral and records were faxed to [phone]

## 2021-05-31 NOTE — PATIENT INSTRUCTIONS - HE
"Swelling in the legs can be caused by many reasons. No matter what the reason, treatment usually includes some type of compression. . You should wear your compression socks as much as you can. Your compression should be put on first thing in the morning. Take the compression off at night. It is especially important to wear them with long periods of sitting/standing, long car rides or if you will be flying. Going without compression for even brief periods of time can be damaging to your legs and your health.  Compression socks should get refilled every 4-6 months. They do not need to be worn at night while in bed. We can refill your sock prescription for 1 year otherwise your primary is able to refill them for you. Call us with any problems or questions. If you do a lot of standing, it is good to do calf raises to help keep the blood pumping. If you sit a lot at work, it is good to get up periodically to walk around. Elevation of the foot of your bed 4-6\" helps the blood return back to where it is needed.      Please call us if you have any questions [phone]    Thank you for choosing [facility].        Ultrasound    Thank you for choosing [facility] [place] as partners in your care.  Please read the following information before your appointment.  Feel free to ask questions.    An ultrasound is an exam that uses sound waves to create pictures.  The special camera that takes the pictures is called a transducer.  An ultrasound technologist will perform the exam under the direction of a physician.    Preparation  Ultrasound preps vary.  If you are scheduled for an Aorta Ultrasound, please do not eat or drink anything 8 hours before your exam.  You may take daily medications with a small sip of water.  There is no preparation required for any of the other ultrasound exams performed at [facility].    The Examination  You may or may not need to change clothes for your exam.  The technologist " will explain the exam to you.  He or she will ask you a few questions and answer any questions you may have.    You will lie on a table and a gel will be applied to your skin.  A small handheld instrument called a transducer will be moved across the area we are looking at while pictures are taken.  Also, your exam may include measuring your blood pressure on your arms, legs and/or toes.    When the Exam Is Completed  Your physician will receive the results of the exam and explain them to you.  You may return to your normal diet and activities unless otherwise directed by your doctor.  Feel free to ask questions if something is not clear to you.  We welcome comments.    At [facility], we are dedicated to providing the best possible care.  Thank you for taking time to read these instructions.  We hope your experience is as pleasant as possible.      You are scheduled for the following exam(s):    []Carotid Duplex:  Evaluates the carotid arteries in the neck that feed the brain with blood.  Gel is applied to the skin of the neck.  A transducer will be placed on the gel covered areas to obtain images and evaluate and listen to the blood flow in the arteries.  This exam takes approximately 30 minutes.    []Venous Duplex:  Evaluates the veins that carry blood to the heart from the arms and/or legs.  Gel is applied to the skin of the arms and/or legs.  A transducer will be placed on the gel covered areas to obtain images and evaluate flow in the veins.  This exam takes approximately 30 minutes per arm/leg.    []Arterial Duplex:  Evaluates the arteries that feed the arms and legs with blood.  Gel is applied to the skin of the arms and/or legs.  A transducer will be placed on the gel covered areas to obtain images and listen to the blood flow in the arms and/or legs.  This exam takes approximately 30 minutes per arm/leg.    [x]TEMO or Segmental Pressures:  Ultrasound and blood pressure cuffs are used to evaluate the arteries  that supply the arms and legs with blood.  Several blood pressure cuffs will be place on your arms and legs.  When inflated, the cuffs will provide blood pressure readings that are used to determine where blockages in the arteries may be.  You may be asked to do a moderate level of exercise during this exam.  This exam takes approximately 30-60 minutes.    []Aorta:  Evaluates the abdominal aorta for blockages and aneurysm.  Gel is applied to the stomach.  A transducer will be placed on the gel covered areas to obtain images of the aorta.  This exam takes approximately 30 minutes.    Prep instructions:  Do not eat or drink anything 8 hours before procedure.  You may take daily medications with a small sip of water.        - You will need to get an x-ray done before your follow up. This can be at any of the [facility] or at [facility] located downstairs on the first floor. No appointment is needed but depending on availability you may have to wait. You can call ahead if you'd like.     [facility]  [address] [facility] [address] [address], [place] [address]   Monday through Friday 7 am to 10 pm, Saturday and Sunday 8 am to 4:40 pm   P: [phone]    [facility] [address] [place] [id number]   Monday through Friday 7 am to 7 pm/ P: [phone]    [facility] [address] [place] [address]   Monday through Friday 7 am to 5 pm, Saturday 9 am to 1 pm/ P: [phone]     [facility] [address] [place] [address]   Monday through Friday 7 am to 6:30pm/ P: [phone]      An X-ray uses a small amount of radiation to create images of your bones and internal organs. X-rays are most often used to detect bone or joint problems, or to check the heart and lungs (chest X-ray).   Let the technologist know   Tell the technologist if you:   Are or may be pregnant   Have had an X-ray of this part of your body before   Have metal in the part of your  body being imaged      Before Your Test   You may be asked to remove your watch, jewelry, or garments with metal closures from the part of your body being imaged. These items can block part of the image.   You may be asked to put on a gown.   You may be asked about your overall health or any medications you take.  During Your Test   You will be asked to lie on a table, sit, or stand.   A lead apron may be draped over part of your body to shield it from the X-rays.   With an X-ray of your chest or abdomen, you will have to take a deep breath and hold it for a few seconds.   Each exam usually requires at least 2 X-rays. You will need to move your body before each new X-ray.  After Your Test   Your doctor will discuss the test results with you during a follow-up appointment or over the phone.     [id number] [employer]. [address] [place] [address], [place] [address]. All rights reserved. This information is not intended as a substitute for professional medical care. Always follow your healthcare professional's instructions.

## 2021-05-31 NOTE — TELEPHONE ENCOUNTER
Writer updated pt on the results. He has not seen an orthopedic surgeon in 50+ years. He will see how the pain progresses and call back for a referral if needed.

## 2021-06-01 ENCOUNTER — RECORDS - HEALTHEAST (OUTPATIENT)
Dept: ADMINISTRATIVE | Facility: CLINIC | Age: 86
End: 2021-06-01

## 2021-06-02 ENCOUNTER — RECORDS - HEALTHEAST (OUTPATIENT)
Dept: ADMINISTRATIVE | Facility: CLINIC | Age: 86
End: 2021-06-02

## 2021-06-03 ENCOUNTER — RECORDS - HEALTHEAST (OUTPATIENT)
Dept: ADMINISTRATIVE | Facility: CLINIC | Age: 86
End: 2021-06-03

## 2021-06-03 VITALS — BODY MASS INDEX: 22.55 KG/M2 | WEIGHT: 157.5 LBS | HEIGHT: 70 IN

## 2021-06-04 VITALS
HEIGHT: 70 IN | TEMPERATURE: 97.5 F | HEART RATE: 58 BPM | DIASTOLIC BLOOD PRESSURE: 62 MMHG | SYSTOLIC BLOOD PRESSURE: 122 MMHG | RESPIRATION RATE: 16 BRPM | WEIGHT: 156.9 LBS | BODY MASS INDEX: 22.46 KG/M2

## 2021-06-04 NOTE — PROGRESS NOTES
Date of Service: 12/19/19    Date last seen:  08/19/2017    PCP: Asha Ng MD     Impression:   1. Leg swelling and pain  2. Right lateral pseudoarthritis  3. Secondary acquired lymphedema of legs  4. Hyperkeratosis of skin of toes.  5. PAD     Plan:   1. Questions were answered.   2. Knows self massage and does when needed.  Continue compression.  3. Reviewed TEMO's.  4. Following with Dr. Ivey if pain right leg continues.   5. Discussed exercise and getting out more.  Discussed multiple opportunities.  6. Patient will follow up in 6 months, or when needed.     Time spent with patient 15 minutes with greater than 50% time in consultation, education and coordination of care.   ---------------------------------------------------------------------------------------------------------------------     Chief Complaint: Bilateral leg swelling     History of Present Illness:   Humza Arvizu returns to the [facility] for follow up of bilateral leg swelling due to secondary acquired lymphedema, hyperkeratosis and known underlying PAD treated with education, range of motion work and exercise. He was lost to follow up and then saw Dr. Barron on 5/2/2019 complaining about waking up in the mornings now with pain in his right lower leg which improved as the day went on.  He was being treated with Lamisil for toenail infection of fungus by the dermatologist.  He had been losing weight and was not wearing his compression.  He was getting more tightness in his feet and had increased pain in the right leg with noted old severe fracture of the right leg.  ABIs showed mild peripheral arterial disease, but x-ray showed pseudoarthrosis across the fibular shaft fracture site.  He has seen Dr. Patrick Ivey who recommends monitoring it at this time.  If it does not improve then open reduction internal fixation of the right leg fracture would be an option.  There has been no new  numbness, tingling, weakness, masses, rashes, shortness of breath or chest pain. There have not been any new areas of ulceration. There has been no new fevers. There are no new or changing skin lesions.  His weight is stable.       Past Medical History:   Diagnosis Date     Coronary Artery Disease     Created by Conversion      Dyslipidemia     Created by Conversion      Hypertension     Created by Conversion        Past Surgical History:   Procedure Laterality Date     CARDIAC CATHETERIZATION       CORONARY ARTERY BYPASS GRAFT       PA CABG, VEIN, SINGLE      Description: CABG (CABG);  Proc Date: 09/24/2010;  Annotations: LIMA to LAD, SVG to Intermediate, SVG to Cx     PA CABG, VEIN, SINGLE      Description: CABG (CABG);  Proc Date: 09/24/2010;  Annotations: LIMA to LAD, SVG to Intermediate, SVG to Cx         Current Outpatient Medications:      atenolol (TENORMIN) 25 MG tablet, Take 25 mg by mouth daily. , Disp: , Rfl:      cholecalciferol, vitamin D3, (VITAMIN D3) 1,000 unit capsule, Take 1,000 Units by mouth daily., Disp: , Rfl:      clopidogrel (PLAVIX) 75 mg tablet, Take 75 mg by mouth daily. , Disp: , Rfl:      co-enzyme Q-10 50 mg capsule, Take 50 mg by mouth daily., Disp: , Rfl:      folic acid (FOLVITE) 800 MCG tablet, Take 1 tablet by mouth., Disp: , Rfl:      magnesium oxide 250 mg Tab, Take by mouth., Disp: , Rfl:      OMEGA-3/DHA/EPA/FISH OIL (FISH OIL-OMEGA-3 FATTY ACIDS) 300-1,000 mg capsule, Take 2 g by mouth daily., Disp: , Rfl:      pravastatin (PRAVACHOL) 20 MG tablet, Take 20 mg by mouth., Disp: , Rfl:      TURMERIC ROOT EXTRACT ORAL, Take 1 tablet by mouth daily., Disp: , Rfl:     Allergies   Allergen Reactions     Bee Pollen Other (See Comments)     Spring pollens       Social History     Socioeconomic History     Marital status:      Spouse name: Not on file     Number of children: Not on file     Years of education: Not on file     Highest education level: Not on file   Occupational  History     Not on file   Social Needs     Financial resource strain: Not on file     Food insecurity:     Worry: Not on file     Inability: Not on file     Transportation needs:     Medical: Not on file     Non-medical: Not on file   Tobacco Use     Smoking status: Never Smoker     Smokeless tobacco: Never Used   Substance and Sexual Activity     Alcohol use: Yes     Alcohol/week: 2.5 standard drinks     Types: 3 Standard drinks or equivalent per week     Drug use: No     Sexual activity: Yes     Partners: Female     Birth control/protection: Post-menopausal   Lifestyle     Physical activity:     Days per week: Not on file     Minutes per session: Not on file     Stress: Not on file   Relationships     Social connections:     Talks on phone: Not on file     Gets together: Not on file     Attends Restorationism service: Not on file     Active member of club or organization: Not on file     Attends meetings of clubs or organizations: Not on file     Relationship status: Not on file     Intimate partner violence:     Fear of current or ex partner: Not on file     Emotionally abused: Not on file     Physically abused: Not on file     Forced sexual activity: Not on file   Other Topics Concern     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  Lives in house.        Family History   Problem Relation Age of Onset     Arthritis Mother      Heart disease Mother      Heart disease Father      Stroke Sister      Heart disease Sister      Macular degeneration Sister      Heart attack Maternal Uncle      Heart disease Brother      Diabetes Brother      No Medical Problems Daughter      No Medical Problems Son      No Medical Problems Sister      Review of Systems:    Humza Arvizu no new numbess, tingling or weakness, redness or rashes, fevers, new masses, abdominal bloating or discomfort, unexplained weight loss, increased pain, new ulcers, shortness of  breath and chest pain  Full 12 point review of systems was completed.    Imaging:    I personally reviewed the following imaging results today and those on care everywhere, if indicated    EXAM: ULTRASOUND ABDOMEN COMPLETE  LOCATION: [facility]  DATE/TIME: 05/08/2019, 11:09 AM    INDICATION: Elevated bilirubin level, weight loss in the last year.  COMPARISON: None.    FINDINGS:  GALLBLADDER: The gallbladder is normal in appearance without evidence of wall thickening or stones. The patient was not focally tender over the gallbladder. No pericholecystic fluid was seen.  BILE DUCTS: No intrahepatic biliary ductal dilatation was seen. The common bile duct measures 6 mm.  LIVER: There are multiple cysts within the liver with the largest measuring 2.9 cm in greatest dimension. There is hepatopetal flow in the portal vein.  RIGHT KIDNEY: The right kidney measures 11.5 cm. There is normal renal echotexture with no evidence for hydronephrosis, focal mass, or shadowing stone.  LEFT KIDNEY: The left kidney measures 11.3 cm. There is normal renal echotexture with no evidence for hydronephrosis, focal mass, or shadowing stone.  SPLEEN: The spleen measures 9.8 cm.  PANCREAS: The visualized portions of the pancreas are normal.  AORTA: There is ectasia of the infrarenal abdominal aorta measuring 2.3 x 2.6 cm in size.  IVC: The visualized portions of the IVC are patent.    No ascites is seen.    CONCLUSION:  1.  Multiple hepatic cysts.  2.  No biliary dilatation.  3.  Ectasia of the infrarenal abdominal aorta.      EXAM DATE:         08/19/2019     EXAM: X-RAY TIBIA FIBULA RIGHT, AP AND LATERAL  LOCATION: [facility]  DATE/TIME: 8/19/2019 11:45 AM     INDICATION: old right fibular fracture now with increasing pain  COMPARISON: None.     FINDINGS: Remote appearing fracture deformity of the proximal fibular shaft with  associated pseudoarthrosis along the fracture margin.     ORIF fixation of a tibial mid  shaft fracture with mild associated bony  deformity. Alignment appears satisfactory. The plate and screw construct appears  to be intact without evidence of loosening. No acute tibial fracture.     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  [facility] OUTPATIENT     DATE: 8/19/2019 11:36 AM     EXAM: RESTING ANKLE-BRACHIAL INDICES (ABIs)     INDICATION: Claudication.     COMPARISON: None.     TEMO FINDINGS:      SEGMENTAL BP  RIGHT (mmHg)  Brachial: 170  High Thigh: 207; Index 1.22  Low Thigh: 192; Index 1.13  Calf: 172; Index 1.01  Ankle (PT): 179; Index 1.05  Ankle (DP): 168; Index 0.99  Digit: 82; Index 0.48     LEFT (mmHg)  Brachial: 165  High Thigh: 194; Index 1.14  Low Thigh: 180; Index 1.06  Calf: 138; Index 0.81  Ankle (PT): 127; Index 0.75  Ankle (DP): 141; Index 0.83  Digit: 72; Index 0.42        IMPRESSION:   RIGHT LOWER EXTREMITY:   1.  Normal resting ankle-brachial index. Decreased toe brachial index. Digital pressures suggest potential wound healing.     LEFT LOWER EXTREMITY:  1.  Mildly     Labs:    I personally reviewed the following lab results today and those on care everywhere, if indicated    No results found for: SEDRATE      No results found for: CRP        Lab Results   Component Value Date    CREATININE 0.84 09/28/2010      No results found for: HGBA1C        Lab Results   Component Value Date    BUN 18 09/28/2010              No results found for: LABPROT, ALBUMIN    No results found for: [id number]    No results found for: TSH  Lab Results   Component Value Date    WBC 5.8 09/21/2010    HGB 10.5 (L) 09/27/2010    HCT 46.7 09/21/2010    MCV 89 09/21/2010     (L) 09/28/2010     5/3/2019 TSH 1.39 magnesium 2.5 AST 16 ALT 14 total protein 6.7 albumin 3.9 bilirubin total 1.7 alk phos 61 BUN 22 creatinine 0.96 GFR greater than 60 hemoglobin A1c 6 PSA 3    Physical Exam:  Vitals:    12/19/19 1107   BP: 122/62   Pulse: (!) 58   Resp: 16   Temp:  97.5  F (36.4  C)      BMI 22.67 weight 157 pounds    Circumferential measures:    Vasc Edema 8/24/2015 5/19/2016 1/9/2017 8/19/2019 12/19/2019   Right just above MTP 23.5  24.5 23.1 23.7 23.3   Right Ankle 22.0 24 21.4 21.7 20.9   Right Widest Calf 35.5 36.5 37.2 34.6 33.4   Right Thigh Up 10cm 41.0 42.5 43.3 39.8 39.8   Left - just above MTP 23.3 28 22.8 24 23.5   Left Ankle 22.1 23 21.6 21.5 20.8   Left Widest Calf 35 37 36.1 35 33.6   Left Thigh Up 10cm 42 42.5 41.3 40.6 40     Circumferential numbers decreased and side to side stable.    General:  84 y.o. male in no apparent distress.     Psych: Alert and oriented x 3.  Cooperative. Affect slightly depressed.     HEENT:  Atraumatic and normocephalic.    Musculoskeletal:  Normal range of motion in knees and ankles bilaterally.  There is no active joint synovitis, erythema, swelling or joint laxity.    Neurological:  Sensation is intact to pinprick and light touch in both legs.  Strength testing is normal in hip flexion, knee flexion, knee extension, ankle dorsiflexion and great toe extension bilaterally.      Vascular: Foot pulses hard to palpate. There are no significant telangietasias, medial ankle venous flares, venous varicosities  and spider veins . Dependent rubor.     Integumentary: Skin of the legs is uniformly warm and dry.  Nails are thickened and discolored.  No open ulcerations.    Tennille Severino MD, ABWMS, FACCWS, [facility]  Medical Director Wound Care and Lymphedema  [facility]  [phone]

## 2021-06-04 NOTE — PATIENT INSTRUCTIONS - HE
"Swelling in the legs can be caused by many reasons. No matter what the reason, treatment usually includes some type of compression. You should wear your compression socks as much as you can. Your compression should be put on first thing in the morning. Take the compression off at night. It is especially important to wear them with long periods of sitting/standing, long car rides or if you will be flying. Going without compression for even brief periods of time can be damaging to your legs and your health.  Compression socks should get refilled every 4-6 months. They do not need to be worn at night while in bed. We can refill your sock prescription for 1 year otherwise your primary is able to refill them for you. Call us with any problems or questions. If you do a lot of standing, it is good to do calf raises to help keep the blood pumping. If you sit a lot at work, it is good to get up periodically to walk around. Elevation of the foot of your bed 4-6\" helps the blood return back to where it is needed.     Please call us if you have any questions [phone]    Thank you for choosing [facility].              "

## 2021-06-05 VITALS
TEMPERATURE: 98 F | WEIGHT: 160 LBS | HEART RATE: 58 BPM | DIASTOLIC BLOOD PRESSURE: 70 MMHG | RESPIRATION RATE: 20 BRPM | HEIGHT: 70 IN | SYSTOLIC BLOOD PRESSURE: 104 MMHG | BODY MASS INDEX: 22.9 KG/M2

## 2021-06-08 NOTE — PROGRESS NOTES
jDate of Service: 01/09/17    Date last seen:  05/19/2016    PCP: Keri Uriarte CNP     Impression:   1. Leg swelling and pain  2.  Ankle contractures  3. Secondary acquired lymphedema of legs  4. Hyperkeratosis of skin of toes.  5. Known history of PAD     Plan:   1. Questions were answered.   2. Knows self massage and does when needed.  Continue compression.  3. Discussed range of motion of ankles and getting back to exercise program.  I suspect his increase pain is due to decreased function.  He has not been exercising as much with his wife being ill.  He is good about updating his stockings.  4. Will recheck TEMO's in view of PAD history.   5. Patient will follow up in 3 months or when needed.     Time spent with patient 25 minutes with greater than 50% time in consultation, education and coordination of care.   ---------------------------------------------------------------------------------------------------------------------     Chief Complaint: Bilateral leg swelling     History of Present Illness:   Humza Arvizu returns to the [facility] for follow up of bilateral leg swelling with secondary acquired lymphedema, hyperkeratosis and known underlying PAD.  The patient's previous treatment has included education, range of motion work and exercise. Present compression the patient is using is compression stockings. He continues to get swelling in the morning in the feet. There has been no new numbness, tingling, weakness, masses, rashes, shortness of breath or chest pain. There have not been any new areas of ulceration. There has been no new fevers. There are no new or changing skin lesions. He feels his swelling is under excellent control and he is exercising regularly.  He follows up with cardiology and is doing well.  His wife has metastatic breast cancer and this has been difficult.   He has noticed more aching in his calves when he walks in the mall.  If he stops walking it feels  better.  He sleeps in a bed and does not wake up with pain.      Past Medical History   Diagnosis Date     Coronary Artery Disease      Created by Conversion      Dyslipidemia      Created by Conversion      Hypertension      Created by Conversion        Past Surgical History   Procedure Laterality Date     Pr cabg, vein, single       Description: CABG (CABG);  Proc Date: 09/24/2010;  Annotations: LIMA to LAD, SVG to Intermediate, SVG to Cx     Pr cabg, vein, single       Description: CABG (CABG);  Proc Date: 09/24/2010;  Annotations: LIMA to LAD, SVG to Intermediate, SVG to Cx     Cardiac catheterization       Coronary artery bypass graft           Current Outpatient Prescriptions:      atenolol (TENORMIN) 25 MG tablet, , Disp: , Rfl:      cholecalciferol, vitamin D3, (VITAMIN D3) 1,000 unit capsule, Take 1,000 Units by mouth daily., Disp: , Rfl:      clopidogrel (PLAVIX) 75 mg tablet, , Disp: , Rfl:      co-enzyme Q-10 50 mg capsule, Take 50 mg by mouth daily., Disp: , Rfl:      magnesium oxide 250 mg Tab, Take by mouth., Disp: , Rfl:      OMEGA-3/DHA/EPA/FISH OIL (FISH OIL-OMEGA-3 FATTY ACIDS) 300-1,000 mg capsule, Take 2 g by mouth daily., Disp: , Rfl:      TURMERIC ROOT EXTRACT ORAL, Take 1 tablet by mouth daily., Disp: , Rfl:     Allergies   Allergen Reactions     Bee Pollen Other (See Comments)     Spring pollens       Social History     Social History     Marital status:      Spouse name: N/A     Number of children: N/A     Years of education: N/A     Occupational History     Not on file.     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  Lives in house.        Family History   Problem Relation Age of Onset     Arthritis Mother      Heart disease Mother      Heart disease Father      Stroke Sister      Heart disease Sister      Macular degeneration Sister      Heart attack Maternal Uncle      Heart disease Brother      Diabetes Brother      No Medical Problems Daughter      No Medical Problems Son      No Medical Problems Sister        Review of Systems:  Humza Arvizu no new numbess, tingling or weakness, redness or rashes, fevers, new masses, abdominal bloating or discomfort, unexplained weight loss, increased pain, new ulcers, shortness of breath and chest pain  Full 12 point review of systems was completed.    Physical Exam:  Vitals:    01/09/17 1031   BP: 100/40   Pulse: (!) 56   Resp: 16   Temp: 97.5  F (36.4  C)    There is no height or weight on file to calculate BMI.    Circumferential measures:    Vasc Edema 2/23/2015 8/24/2015 5/19/2016 1/9/2017   Right just above MTP 24.6 23.5  24.5 23.1   Right Ankle 22.8 22.0 24 21.4   Right Widest Calf 36.6 35.5 36.5 37.2   Right Thigh Up 10cm 41.8 41.0 42.5 43.3   Left - just above MTP 24.5 23.3 28 22.8   Left Ankle 22.0 22.1 23 21.6   Left Widest Calf 37.5 35 37 36.1   Left Thigh Up 10cm 42.8 42 42.5 41.3     Circumferential numbers look good.    General:  81 y.o. male in no apparent distress.  Alert and oriented x 3.  Cooperative. Affect normal.  He become somewhat tearful talking about his wife.    Abdominal: Normal bowel sounds without any pain, guarding,masses or rigidity. No inguinal lymphadenopathy palpated.    Musculoskeletal:  Normal range of motion in hips, knees and ankles bilaterally throughout .  There is no active joint synovitis, erythema, swelling or joint laxity.  SLR negative bilaterally.    Neurological:  Sensation is slightly decreased to pinprick and light touch in the feet bilaterally.  Strength testing is normal in hip flexion, knee flexion, knee extension, ankle  dorsiflexion and great toe extension bilaterally.      Vascular: Dorsalis pedis and posterior tibialis pulses are strong and equal bilaterally and heard with a hand held doppler. There are no significant telangietasias, medial ankle venous flares, venous varicosities  and spider veins . There is normal capillary refill.     Integumentary: Skin of the legs is uniformly warm and dry.  Nails are thickened.     Tennille Severino MD, ABWMS, FACCWS, [facility]  Medical Director Wound Care and Lymphedema  [facility]  [phone]

## 2021-06-09 NOTE — PROGRESS NOTES
Type of service:  Video Visit       Video Start and End Time : 10:44 am - 11:11 am    Originating Location (pt. Location):  Home      Distant Location (provider location):  [facility] [place]         Mode of Communication:      Date of Service: 06/18/20    Date last seen:  12/19/2019    PCP: Asha Ng MD     Impression:   1. Leg swelling and pain-stable  2. Dependent swelling with venous hypertension-stable  3. Right lateral pseudoarthritis  4. Secondary acquired lymphedema of legs  5. PAD     Plan:   1. Questions were answered.  He seemed somewhat depressed with the quarantine.  We discussed ways of getting more involved and also ways of increasing his outdoor and exercise programs.  Attention was given to the social distancing and quarantining restrictions.  2. Knows self massage and does when needed.  Continue compression and exercise.  Discussed how to modify wearing compression with heat.   3. Discussed importance of and how to exercise on a regular basis.  Modifications reviewed. Discussed internet and cable options.  4. Following with Dr. Ivey if pain right leg continues.   5. I reviewed what to watch for with the peripheral arterial disease.  He starts to get increased pain with elevation of his legs or with walking he is to let us know.  If he gets any sores he is to let us know.  If there are any infections.  If he has any swelling that is not getting under control he should give us a call.  6. Patient will follow up in 4 months, or when needed.  He requested follow-up earlier.  He would like to see us in clinic.  At that time I will also get ABIs done to follow-up with his arterial issues.    ---------------------------------------------------------------------------------------------------------------------     Chief Complaint: Bilateral leg swelling     History of Present Illness:   Humza Arvizu returns to the [facility], as a  video visit due to the COVID 19 pandemic,  for follow up of bilateral leg swelling due to secondary acquired lymphedema, hyperkeratosis, pseudoarthrosis across old right fib/shaft fracture and known underlying PAD treated with education, range of motion work and exercise. The fractured was being monitored by Dr. Patrick Ivey.  At his last visit we discussed the importance of managing the swelling with self massage and compression.  We discussed exercise.  I reviewed his ABIs and what to watch for.  If he was getting more pain in the right leg he was to follow-up with orthopedics.  He is now here for his 6-month follow-up.  Mr. Arvizu reports overall he is doing well but is having some problems dealing with the COVID-19 pandemic. Tthere has been no new numbness, tingling, weakness, masses, rashes, shortness of breath or chest pain. He has had no increasing leg pain.   There have not been any new areas of ulceration. There has been no new fevers. There are no new or changing skin lesions.  His weight is stable.       Past Medical History:   Diagnosis Date     Coronary Artery Disease     Created by Conversion      Dyslipidemia     Created by Conversion      Hypertension     Created by Conversion        Past Surgical History:   Procedure Laterality Date     CARDIAC CATHETERIZATION       CORONARY ARTERY BYPASS GRAFT       NE CABG, VEIN, SINGLE      Description: CABG (CABG);  Proc Date: 09/24/2010;  Annotations: LIMA to LAD, SVG to Intermediate, SVG to Cx     NE CABG, VEIN, SINGLE      Description: CABG (CABG);  Proc Date: 09/24/2010;  Annotations: LIMA to LAD, SVG to Intermediate, SVG to Cx         Current Outpatient Medications:      atenolol (TENORMIN) 25 MG tablet, Take 25 mg by mouth daily. , Disp: , Rfl:      cholecalciferol, vitamin D3, (VITAMIN D3) 1,000 unit capsule, Take 1,000 Units by mouth daily., Disp: , Rfl:      clopidogrel (PLAVIX) 75 mg tablet, Take 75 mg by mouth daily. , Disp: , Rfl:       co-enzyme Q-10 50 mg capsule, Take 50 mg by mouth daily., Disp: , Rfl:      docosahexaenoic acid-epa 120-180 mg cap, Take 2 g by mouth daily., Disp: , Rfl:      folic acid (FOLVITE) 800 MCG tablet, Take 1 tablet by mouth., Disp: , Rfl:      OMEGA-3/DHA/EPA/FISH OIL (FISH OIL-OMEGA-3 FATTY ACIDS) 300-1,000 mg capsule, Take 2 g by mouth daily., Disp: , Rfl:      pravastatin (PRAVACHOL) 20 MG tablet, Take 20 mg by mouth., Disp: , Rfl:      TURMERIC ROOT EXTRACT ORAL, Take 1 tablet by mouth daily., Disp: , Rfl:      magnesium oxide 250 mg Tab, Take by mouth., Disp: , Rfl:      terbinafine HCL (LAMISIL) 250 mg tablet, Take 1 tablet by mouth daily., Disp: , Rfl:     Allergies   Allergen Reactions     Bee Pollen Other (See Comments)     Spring pollens       Social History     Socioeconomic History     Marital status:      Spouse name: Not on file     Number of children: Not on file     Years of education: Not on file     Highest education level: Not on file   Occupational History     Not on file   Social Needs     Financial resource strain: Not on file     Food insecurity     Worry: Not on file     Inability: Not on file     Transportation needs     Medical: Not on file     Non-medical: Not on file   Tobacco Use     Smoking status: Never Smoker     Smokeless tobacco: Never Used   Substance and Sexual Activity     Alcohol use: Yes     Alcohol/week: 2.5 standard drinks     Types: 3 Standard drinks or equivalent per week     Drug use: No     Sexual activity: Yes     Partners: Female     Birth control/protection: Post-menopausal   Lifestyle     Physical activity     Days per week: Not on file     Minutes per session: Not on file     Stress: Not on file   Relationships     Social connections     Talks on phone: Not on file     Gets together: Not on file     Attends Mu-ism service: Not on file     Active member of club or organization: Not on file     Attends meetings of clubs or organizations: Not on file      Relationship status: Not on file     Intimate partner violence     Fear of current or ex partner: Not on file     Emotionally abused: Not on file     Physically abused: Not on file     Forced sexual activity: Not on file   Other Topics Concern     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  Lives in house.        Family History   Problem Relation Age of Onset     Arthritis Mother      Heart disease Mother      Heart disease Father      Stroke Sister      Heart disease Sister      Macular degeneration Sister      Heart attack Maternal Uncle      Heart disease Brother      Diabetes Brother      No Medical Problems Daughter      No Medical Problems Son      No Medical Problems Sister      Review of Systems:    See HPI.    Imaging:    I personally reviewed the following imaging results today and those on care everywhere, if indicated    EXAM: ULTRASOUND ABDOMEN COMPLETE  LOCATION: [facility]  DATE/TIME: 05/08/2019, 11:09 AM    INDICATION: Elevated bilirubin level, weight loss in the last year.  COMPARISON: None.    FINDINGS:  GALLBLADDER: The gallbladder is normal in appearance without evidence of wall thickening or stones. The patient was not focally tender over the gallbladder. No pericholecystic fluid was seen.  BILE DUCTS: No intrahepatic biliary ductal dilatation was seen. The common bile duct measures 6 mm.  LIVER: There are multiple cysts within the liver with the largest measuring 2.9 cm in greatest dimension. There is hepatopetal flow in the portal vein.  RIGHT KIDNEY: The right kidney measures 11.5 cm. There is normal renal echotexture with no evidence for hydronephrosis, focal mass, or shadowing stone.  LEFT KIDNEY: The left kidney measures 11.3 cm. There is normal renal echotexture with no evidence for hydronephrosis, focal mass, or shadowing stone.  SPLEEN: The spleen measures 9.8 cm.  PANCREAS: The visualized portions of  the pancreas are normal.  AORTA: There is ectasia of the infrarenal abdominal aorta measuring 2.3 x 2.6 cm in size.  IVC: The visualized portions of the IVC are patent.    No ascites is seen.    CONCLUSION:  1.  Multiple hepatic cysts.  2.  No biliary dilatation.  3.  Ectasia of the infrarenal abdominal aorta.      EXAM DATE:         08/19/2019     EXAM: X-RAY TIBIA FIBULA RIGHT, AP AND LATERAL  LOCATION: [facility]  DATE/TIME: 8/19/2019 11:45 AM     INDICATION: old right fibular fracture now with increasing pain  COMPARISON: None.     FINDINGS: Remote appearing fracture deformity of the proximal fibular shaft with  associated pseudoarthrosis along the fracture margin.     ORIF fixation of a tibial mid shaft fracture with mild associated bony  deformity. Alignment appears satisfactory. The plate and screw construct appears  to be intact without evidence of loosening. No acute tibial fracture.     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  [facility] OUTPATIENT     DATE: 8/19/2019 11:36 AM     EXAM: RESTING ANKLE-BRACHIAL INDICES (ABIs)     INDICATION: Claudication.     COMPARISON: None.     TEMO FINDINGS:      SEGMENTAL BP  RIGHT (mmHg)  Brachial: 170  High Thigh: 207; Index 1.22  Low Thigh: 192; Index 1.13  Calf: 172; Index 1.01  Ankle (PT): 179; Index 1.05  Ankle (DP): 168; Index 0.99  Digit: 82; Index 0.48     LEFT (mmHg)  Brachial: 165  High Thigh: 194; Index 1.14  Low Thigh: 180; Index 1.06  Calf: 138; Index 0.81  Ankle (PT): 127; Index 0.75  Ankle (DP): 141; Index 0.83  Digit: 72; Index 0.42        IMPRESSION:   RIGHT LOWER EXTREMITY:   1.  Normal resting ankle-brachial index. Decreased toe brachial index. Digital pressures suggest potential wound healing.     LEFT LOWER EXTREMITY:  1.  Mildly     Labs:    I personally reviewed the following lab results today and those on care everywhere, if indicated    No results found for: SEDRATE      No results  found for: CRP        Lab Results   Component Value Date    CREATININE 0.84 09/28/2010      No results found for: HGBA1C        Lab Results   Component Value Date    BUN 18 09/28/2010              No results found for: LABPROT, ALBUMIN    No results found for: SZYBSKVZ12GI    No results found for: TSH  Lab Results   Component Value Date    WBC 5.8 09/21/2010    HGB 10.5 (L) 09/27/2010    HCT 46.7 09/21/2010    MCV 89 09/21/2010     (L) 09/28/2010     5/3/2019 TSH 1.39 magnesium 2.5 AST 16 ALT 14 total protein 6.7 albumin 3.9 bilirubin total 1.7 alk phos 61 BUN 22 creatinine 0.96 GFR greater than 60 hemoglobin A1c 6 PSA 3    Physical Exam:  There were no vitals filed for this visit.   BMI 22.67 weight 157 pounds    Circumferential measures:    Vasc Edema 8/24/2015 5/19/2016 1/9/2017 8/19/2019 12/19/2019   Right just above MTP 23.5  24.5 23.1 23.7 23.3   Right Ankle 22.0 24 21.4 21.7 20.9   Right Widest Calf 35.5 36.5 37.2 34.6 33.4   Right Thigh Up 10cm 41.0 42.5 43.3 39.8 39.8   Left - just above MTP 23.3 28 22.8 24 23.5   Left Ankle 22.1 23 21.6 21.5 20.8   Left Widest Calf 35 37 36.1 35 33.6   Left Thigh Up 10cm 42 42.5 41.3 40.6 40     Circumferential numbers decreased and side to side stable.    General:  85 y.o. male in no apparent distress.     Psych: Alert and oriented x 3.  Cooperative. Affect slightly depressed.     HEENT:  Atraumatic and normocephalic.    Tennille Severino MD, ABWMS, FACCWS, [facility]  Medical Director Wound Care and Lymphedema  [facility]  [phone]

## 2021-06-09 NOTE — PROGRESS NOTES
"Humza Arvizu is a 85 y.o. male who is being evaluated via a billable video visit.      The patient has been notified of following:     \"This video visit will be conducted via a call between you and your physician/provider. We have found that certain health care needs can be provided without the need for an in-person physical exam.  This service lets us provide the care you need with a video conversation.  If a prescription is necessary we can send it directly to your pharmacy.  If lab work is needed we can place an order for that and you can then stop by our lab to have the test done at a later time.    Video visits are billed at different rates depending on your insurance coverage. Please reach out to your insurance provider with any questions.    If during the course of the call the physician/provider feels a video visit is not appropriate, you will not be charged for this service.\"    Patient has given verbal consent to a Video visit? Yes    How would you like to obtain your AVS? AVS Preference: MyChart.  Patient would like the video invitation sent by: Text to cell phone: [phone]    Will anyone else be joining your video visit? No          Additional provider notes: in chart    Video-Visit Details    Type of service:  Video Visit    Originating Location (pt. Location): Home    Distant Location (provider location):  [facility]      Platform used for Video Visit: [employer]\  Patient reports: pt continues to wear bilateral compression stocking; since it started getting hot; Pt has not been  Wearing his  Compression. No change in swollen; he denied pain, tingling or numbness.     Change in status of the wound:  NA    Change of drainage- color, amount, odor:  NA    Change of swelling:  the same , no change    Change in Pain status:  no    New wounds developed:  NA    Dressing Supplies Sufficient:  NA    Reviewed with patient that I will contact them with scheduling a follow up " appointment, supply needs and any further teaching that is identified by the provider during the call. I will also send out an AVS with all education, a wound measuring tape and their next scheduled visit. I will include instructions to assist with installing the application on their mobile device if appropriate for future video visits.     Bella Torres LPN.

## 2021-06-09 NOTE — PATIENT INSTRUCTIONS - HE
"  Continue self massage, compression and exercise    Follow up with Dr. Ivey if pain right leg continues.     Please call the clinic if you experience any of the following:  Increased pain with elevation of his legs or with walking  New sores or infections.   Any swelling that is not getting under control     Follow up in 4 months, or when needed.      Swelling in the legs can be caused by many reasons. No matter what the reason, treatment usually includes some type of compression. You should wear your compression socks as much as you can. Your compression should be put on first thing in the morning. Take the compression off at night. It is especially important to wear them with long periods of sitting/standing, long car rides or if you will be flying. Going without compression for even brief periods of time can be damaging to your legs and your health.  Compression socks should get replaced usually every 4-6 months. They do not need to be worn at night while in bed. If we have seen you in the last year, we can refill your sock prescription, otherwise your primary care provider is able to refill them for you. Call us with any problems or questions.   Compression Velcro may need to be replaced every 9-12 months.  Often the liners and socks need to be replaced every three or four months.  The neoprene velcro may last up to 2 years.  If the velcro becomes worn a tailor may be able to repair it for you inexpensively.    If you do a lot of standing, it is good to do calf raises to help keep the blood pumping. If you sit a lot at work, it is good to get up periodically to walk around. Elevation of the foot of your bed 4-6\" helps the blood return back to where it is needed.   Please call us if you have any questions [phone]    Thank you for choosing [facility].    "

## 2021-06-12 NOTE — PATIENT INSTRUCTIONS - HE
"Continue to exercise.    Following with Dr. Ivey if pain right leg continues.     If increased pain with elevation of legs or with walking, call the clinic at [phone] or if any sores, infection, or increased swelling.    Patient will follow up in 6 months, or when needed.      Swelling in the legs can be caused by many reasons. No matter what the reason, treatment usually includes some type of compression. You should wear your compression socks as much as you can. Your compression should be put on first thing in the morning. Take the compression off at night. It is especially important to wear them with long periods of sitting/standing, long car rides or if you will be flying. Going without compression for even brief periods of time can be damaging to your legs and your health.  Compression socks should get replaced usually every 4-6 months. They do not need to be worn at night while in bed. If we have seen you in the last year, we can refill your sock prescription, otherwise your primary care provider is able to refill them for you. Call us with any problems or questions.   Compression Velcro may need to be replaced every 9-12 months.  Often the liners and socks need to be replaced every three or four months.  The neoprene velcro may last up to 2 years.  If the velcro becomes worn a tailor may be able to repair it for you inexpensively.    If you do a lot of standing, it is good to do calf raises to help keep the blood pumping. If you sit a lot at work, it is good to get up periodically to walk around. Elevation of the foot of your bed 4-6\" helps the blood return back to where it is needed.   Please call us if you have any questions [phone]    Thank you for choosing [facility].  "

## 2021-06-12 NOTE — TELEPHONE ENCOUNTER
----- Message from Tennille Severino MD sent at 10/1/2020  5:17 PM CDT -----  Please let Mehdi know his arterial US looks good with adequate blood flow.

## 2021-06-15 NOTE — PROGRESS NOTES
[facility] Care Office Consult     Assessment:     1. Coronary atherosclerosis due to lipid rich plaque angiography September 2010 showed a 50% proximal and 50% distal left main lesion, 80% proximal left anterior descending, 70% mid, and 50% distal lesion.  Circumflex had an ostial 99% lesion with a 75% proximal first obtuse marginal artery, ramus had a proximal 90% and mid 70% lesion in the right coronary artery had a mid and distal 25% lesion.  This prompted coronary artery bypass grafting.   2. S/P CABG (coronary artery bypass graft) -September 24, 2010 patient had a LIMA to the LAD, vein graft to the ramus, and vein graft to the first obtuse marginal artery.  Stress test with us in 2016 was normal however stress test over at [facility] just recently I would tend to concur with this given the lack of chest discomfort palpitations and shortness of breath.  December suggest a small area of inferior ischemia felt to be low risk and medical therapy.     3. Hypertension -under good control.  I wonder if may be too low and would back off and atenolol for 2 weeks to see if he feels better.   4. Hypercholesteremia -LDL is elevated at 109 and he was told to go on atorvastatin and is very hesitant.  He is tolerated simvastatin in the past and possibly consider utilizing this.   5. Hyponatremia -sodium 129 and a few days before that was 128.  Wonder if he is dehydrated or dizzy of the syndrome of inappropriate antidiuretic hormone and would suggest workup of this.   6.  Fatigue-I believe this is the reason he sought a second opinion here today.  Doubt is due to coronary ischemia although could consider angiography if persists.  I strongly suspect is due to depression over the death of his wife especially in light of weight loss as well as loss of appetite and would recommend antidepressant but I defer to his primary.  Lastly, weight loss could indicate possible malignancy in the face of hyponatremia would workup  SIADH.     Plan:   1.  Patient on several occasions is asking for my recommendations.  2.  I told her to try atenolol half dose for 2 weeks to see if symptoms improve.  3.  I did agree with using statin, I recommended simvastatin just because he tolerated in the past although I find atorvastatin very useful.  4.  I recommend checking urine and serum osmolality and sodium levels looking for SIADH and address if abnormal.  5.  I did recommend coronary angiography only if all else fails.  6.  I did suggest that he speak with his primary about antidepressant.  7.  Since he is seen primary, cardiologist over at [facility], Dr. Jose Ford as well as Dr. Mik Epps I am very hesitant to arrange follow-up with me.    History of Present Illness:   Thank you for asking the [facility] Heart Care team to see Humza Arvizu a 82 y.o.  male  in consultation  to evaluate fatigue and borderline abnormal stress test.   Patient is  for the last 6 or 7 months.  He has been generally depressed, has lost appetite as well as lost weight.  He has been fatigued and does not have the endurance in November that he wanted to shovel the driveway.  There is no chest discomfort, palpitations, shortness of breath, PND, orthopnea or peripheral edema.  He underwent a nuclear stress test in December at [facility] suggesting small inferior defect felt not to be significant.    Past Medical History:     Past Medical History:   Diagnosis Date     Coronary Artery Disease     Created by Conversion      Dyslipidemia     Created by Conversion      Hypertension  Basilar artery stenosis with intervention      Past history is negative for cancer, tuberculosis, diabetes mellitus, myocardial infarction,  rheumatic fever,  cerebrovascular accident, chronic kidney disease, peptic ulcer disease, chronic obstructive pulmonary disease, or thyroid disorder.    Past Surgical History:     Past Surgical History:   Procedure Laterality Date      "CARDIAC CATHETERIZATION       CORONARY ARTERY BYPASS GRAFT       FL CABG, VEIN, SINGLE      Description: CABG (CABG);  Proc Date: 09/24/2010;  Annotations: LIMA to LAD, SVG to Intermediate, SVG to Cx     FL CABG, VEIN, SINGLE      Description: CABG (CABG);  Proc Date: 09/24/2010;  Annotations: LIMA to LAD, SVG to Intermediate, SVG to Cx     Family History:   Family history positive for coronary artery bypass grafting in his brother in his 70s.    Social History:   He is recently  and lives alone independently, is retired from working on rental properties, reports that he has never smoked. He has never used smokeless tobacco. He reports that he drinks about 1.5 oz of alcohol per week  He reports that he does not use illicit drugs. The primary care physician is Keri Uriarte CNP    Meds:   Scheduled Meds:  Current Outpatient Prescriptions   Medication Sig Dispense Refill     atenolol (TENORMIN) 25 MG tablet Take 25 mg by mouth daily.        cholecalciferol, vitamin D3, (VITAMIN D3) 1,000 unit capsule Take 1,000 Units by mouth daily.       clopidogrel (PLAVIX) 75 mg tablet Take 75 mg by mouth daily.        co-enzyme Q-10 50 mg capsule Take 50 mg by mouth daily.       magnesium oxide 250 mg Tab Take by mouth.       OMEGA-3/DHA/EPA/FISH OIL (FISH OIL-OMEGA-3 FATTY ACIDS) 300-1,000 mg capsule Take 2 g by mouth daily.       TURMERIC ROOT EXTRACT ORAL Take 1 tablet by mouth daily.       No current facility-administered medications for this visit.      PRN Meds:.    Allergies:   Bee pollen    Objective:      Physical Exam  165 lb 12.8 oz (75.2 kg)  5' 9.75\" (1.772 m)  Body mass index is 23.96 kg/(m^2).  /64 (Patient Site: Right Arm, Patient Position: Sitting, Cuff Size: Adult Large)  Pulse (!) 59  Resp 16  Ht 5' 9.75\" (1.772 m) Comment: shoes on  Wt 165 lb 12.8 oz (75.2 kg) Comment: shoes on  BMI 23.96 kg/m2    General Appearance:   Alert, cooperative and in no acute distress.   HEENT:  No scleral " icterus; the mucous membranes were pink and moist.   Neck: JVP normal. No thyromegaly. No HJR   Chest: The spine was straight. The chest was symmetric.   Lungs:   Respirations unlabored; the lungs are clear to auscultation.   Cardiovascular:   S1 and S2 without murmur, clicks or rubs. Brachial, radial, carotid and posterior tibial pulses are intact and symetrical.  No carotid bruits noted   Abdomen:  No organomegaly, masses, bruits, or tenderness. Bowels sounds are present   Extremities: No cyanosis, clubbing, or edema.   Skin: No xanthelasma.   Neurologic: Mood and affect are appropriate.         Lab Reviewed Personally by myself  Lab Results   Component Value Date     (L) 09/28/2010    K 3.8 09/28/2010    CL 96 (L) 09/28/2010    CO2 31 09/28/2010    BUN 18 09/28/2010    CREATININE 0.84 09/28/2010    CALCIUM 8.5 09/28/2010     Lab Results   Component Value Date    WBC 5.8 09/21/2010    HGB 10.5 (L) 09/27/2010    HCT 46.7 09/21/2010    MCV 89 09/21/2010     (L) 09/28/2010     No results found for: CHOL, TRIG, HDL, LDLDIRECT  No results found for: BNP    ECG personally reviewed by myself shows sinus rhythm with left axis and left incomplete bundle branch block pattern.     Review of Systems:     Review of Systems:   General: Weight Loss  Eyes: WNL  Ears/Nose/Throat: WNL  Lungs: WNL  Heart: WNL  Stomach: WNL  Bladder: WNL  Muscle/Joints: WNL  Skin: WNL  Nervous System: WNL  Mental Health: WNL     Blood: WNL

## 2021-06-29 NOTE — PROGRESS NOTES
Progress Notes by Tennille Severino MD at 10/19/2020 10:45 AM     Author: Tennille Severino MD Service: -- Author Type: Physician    Filed: 10/19/2020 12:49 PM Encounter Date: 10/19/2020 Status: Signed    : Tennille Severino MD (Physician)                     Date of Service: 10/19/20    Date last seen:  06/18/20    PCP: Asha Ng MD     Impression:   1. Leg swelling and pain-stable  2. Dependent swelling with venous hypertension-stable  3. Right lateral pseudoarthritis  4. Secondary acquired lymphedema of legs  5. PAD  6. Elevated PSA    Plan:   1. Questions were answered.  He continues to be depressed with the quarantine.  We discussed additional exercise things.    2. Knows self massage and does when needed.  Continue compression and exercise.  3. Discussed importance of and how to exercise on a regular basis.  Modifications reviewed. Discussed internet and cable options.  Continue to exercise.    4. Following with Dr. Ivey if pain right leg continues.   5. I reviewed what to watch for with the peripheral arterial disease.  If he starts to get increased pain with elevation of his legs or with walking he is to let us know.  If he gets any sores he is to let us know.  If there are any infections.  If he has any swelling that is not getting under control he should give us a call.  6. Patient will follow up in 6 months, or when needed.      Time spent with patient 25 minutes with greater than 50% time in consultation, education and coordination of care, excluding procedures.     ---------------------------------------------------------------------------------------------------------------------     Chief Complaint: Bilateral leg swelling     History of Present Illness:   Humza Arvizu returns to the [facility] for follow up of bilateral leg swelling due to secondary acquired lymphedema, hyperkeratosis, pseudoarthrosis across old right  fib/shaft fracture and known underlying PAD treated with education, range of motion work and exercise. The fractured was being monitored by Dr. Patrick Ivey.  At his 6-month follow-up he was doing well but having some problems dealing with the COVID-19 pandemic. We talked about options to keep active and engaged.  He was to his compression and massage.  He is here for his follow up and his TEMO's look stable with monophasic waves noted in right DPA.  He reports his swelling is doing well.  He is still depressed with the virus and the death of his wife in 2015.  He continues to exercise regularly.  He wears his compression.  He has some tightness in the bottom of his feet.   There has been no new numbness, tingling, weakness, masses, rashes, shortness of breath or chest pain. He has had no increasing leg pain.   There have not been any new areas of ulceration. There has been no new fevers. There are no new or changing skin lesions.  His weight is stable. His PSA is elevated and he will be following with his urologist.       Past Medical History:   Diagnosis Date   ? Coronary Artery Disease     Created by Conversion    ? Dyslipidemia     Created by Conversion    ? Hypertension     Created by Conversion        Past Surgical History:   Procedure Laterality Date   ? CARDIAC CATHETERIZATION     ? CORONARY ARTERY BYPASS GRAFT     ? [place] CABG, VEIN, SINGLE      Description: CABG (CABG);  Proc Date: 09/24/2010;  Annotations: LIMA to LAD, SVG to Intermediate, SVG to Cx   ? [place] CABG, VEIN, SINGLE      Description: CABG (CABG);  Proc Date: 09/24/2010;  Annotations: LIMA to LAD, SVG to Intermediate, SVG to Cx         Current Outpatient Medications:   ?  atenolol (TENORMIN) 25 MG tablet, Take 25 mg by mouth daily. , Disp: , Rfl:   ?  cholecalciferol, vitamin D3, (VITAMIN D3) 1,000 unit capsule, Take 1,000 Units by mouth daily., Disp: , Rfl:   ?  clopidogrel (PLAVIX) 75 mg tablet, Take 75 mg by mouth daily. , Disp: , Rfl:   ?   co-enzyme Q-10 50 mg capsule, Take 50 mg by mouth daily., Disp: , Rfl:   ?  docosahexaenoic acid-epa 120-180 mg cap, Take 2 g by mouth daily., Disp: , Rfl:   ?  folic acid (FOLVITE) 800 MCG tablet, Take 1 tablet by mouth., Disp: , Rfl:   ?  magnesium oxide 250 mg Tab, Take by mouth., Disp: , Rfl:   ?  OMEGA-3/DHA/EPA/FISH OIL (FISH OIL-OMEGA-3 FATTY ACIDS) 300-1,000 mg capsule, Take 2 g by mouth daily., Disp: , Rfl:   ?  terbinafine HCL (LAMISIL) 250 mg tablet, Take 1 tablet by mouth daily., Disp: , Rfl:   ?  TURMERIC ROOT EXTRACT ORAL, Take 1 tablet by mouth daily., Disp: , Rfl:   ?  pravastatin (PRAVACHOL) 20 MG tablet, Take 20 mg by mouth., Disp: , Rfl:     Allergies   Allergen Reactions   ? Bee Pollen Other (See Comments)     Spring pollens       Social History     Socioeconomic History   ? Marital status:      Spouse name: Not on file   ? Number of children: Not on file   ? Years of education: Not on file   ? Highest education level: Not on file   Occupational History   ? Not on file   Social Needs   ? Financial resource strain: Not on file   ? Food insecurity     Worry: Not on file     Inability: Not on file   ? Transportation needs     Medical: Not on file     Non-medical: Not on file   Tobacco Use   ? Smoking status: Never Smoker   ? Smokeless tobacco: Never Used   Substance and Sexual Activity   ? Alcohol use: Yes     Alcohol/week: 2.5 standard drinks     Types: 3 Standard drinks or equivalent per week   ? Drug use: No   ? Sexual activity: Yes     Partners: Female     Birth control/protection: Post-menopausal   Lifestyle   ? Physical activity     Days per week: Not on file     Minutes per session: Not on file   ? Stress: Not on file   Relationships   ? Social connections     Talks on phone: Not on file     Gets together: Not on file     Attends Mosque service: Not on file     Active member of club or organization: Not on file     Attends meetings of clubs or organizations: Not on file      Relationship status: Not on file   ? Intimate partner violence     Fear of current or ex partner: Not on file     Emotionally abused: Not on file     Physically abused: Not on file     Forced sexual activity: Not on file   Other Topics Concern   ? 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  Lives in house.        Family History   Problem Relation Age of Onset   ? Arthritis Mother    ? Heart disease Mother    ? Heart disease Father    ? Stroke Sister    ? Heart disease Sister    ? Macular degeneration Sister    ? Heart attack Maternal Uncle    ? Heart disease Brother    ? Diabetes Brother    ? No Medical Problems Daughter    ? No Medical Problems Son    ? No Medical Problems Sister      Review of Systems:    See HPI.    Imaging:    I personally reviewed the following imaging results today and those on care everywhere, if indicated    EXAM: ULTRASOUND ABDOMEN COMPLETE  LOCATION: [facility]  DATE/TIME: 05/08/2019, 11:09 AM    INDICATION: Elevated bilirubin level, weight loss in the last year.  COMPARISON: None.    FINDINGS:  GALLBLADDER: The gallbladder is normal in appearance without evidence of wall thickening or stones. The patient was not focally tender over the gallbladder. No pericholecystic fluid was seen.  BILE DUCTS: No intrahepatic biliary ductal dilatation was seen. The common bile duct measures 6 mm.  LIVER: There are multiple cysts within the liver with the largest measuring 2.9 cm in greatest dimension. There is hepatopetal flow in the portal vein.  RIGHT KIDNEY: The right kidney measures 11.5 cm. There is normal renal echotexture with no evidence for hydronephrosis, focal mass, or shadowing stone.  LEFT KIDNEY: The left kidney measures 11.3 cm. There is normal renal echotexture with no evidence for hydronephrosis, focal mass, or shadowing stone.  SPLEEN: The spleen measures 9.8 cm.  PANCREAS: The visualized portions of  the pancreas are normal.  AORTA: There is ectasia of the infrarenal abdominal aorta measuring 2.3 x 2.6 cm in size.  IVC: The visualized portions of the IVC are patent.    No ascites is seen.    CONCLUSION:  1.  Multiple hepatic cysts.  2.  No biliary dilatation.  3.  Ectasia of the infrarenal abdominal aorta.      EXAM DATE:         08/19/2019     EXAM: X-RAY TIBIA FIBULA RIGHT, AP AND LATERAL  LOCATION: [facility]  DATE/TIME: 8/19/2019 11:45 AM     INDICATION: old right fibular fracture now with increasing pain  COMPARISON: None.     FINDINGS: Remote appearing fracture deformity of the proximal fibular shaft with  associated pseudoarthrosis along the fracture margin.     ORIF fixation of a tibial mid shaft fracture with mild associated bony  deformity. Alignment appears satisfactory. The plate and screw construct appears  to be intact without evidence of loosening. No acute tibial fracture.     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  [facility] OUTPATIENT     DATE: 8/19/2019 11:36 AM     EXAM: RESTING ANKLE-BRACHIAL INDICES (ABIs)     INDICATION: Claudication.     COMPARISON: None.     TEMO FINDINGS:      SEGMENTAL BP  RIGHT (mmHg)  Brachial: 170  High Thigh: 207; Index 1.22  Low Thigh: 192; Index 1.13  Calf: 172; Index 1.01  Ankle (PT): 179; Index 1.05  Ankle (DP): 168; Index 0.99  Digit: 82; Index 0.48     LEFT (mmHg)  Brachial: 165  High Thigh: 194; Index 1.14  Low Thigh: 180; Index 1.06  Calf: 138; Index 0.81  Ankle (PT): 127; Index 0.75  Ankle (DP): 141; Index 0.83  Digit: 72; Index 0.42        IMPRESSION:   RIGHT LOWER EXTREMITY:   1.  Normal resting ankle-brachial index. Decreased toe brachial index. Digital pressures suggest potential wound healing.     LEFT LOWER EXTREMITY:  1.  Mildly   Arterial Duplex Ultrasound 10/1/20    Lower Extremity Artery Evaluation          Indication: Bilateral leg pain/swelling     Previous: 08/19/19     History:  Hypertension, Hyperlipidemia and CAD     Technique: Duplex imaging is performed utilizing gray-scale, two-dimensional images, and color-flow imaging. Doppler waveform analysis and spectral Doppler imaging is also performed.     LOWER EXTREMITY ARTERIAL DUPLEX EXAM WITH WAVEFORMS     Right Leg:(cm/s)  Location: [facility] Waveforms   EIA:   123  T   CFA:   112  T   PFA:   126  B   SFA Proximal:   113  T   SFA Mid:   108  B   SFA Distal:   144  B   Popliteal Artery:   69  B   PTA:   44   B   ADONIS:   62  B   DPA: RETROGADE FLOW   35  B   Waveforms: T=Triphasic, M=Monophasic, B=Biphasic     Left Leg:(cm/s)  Location: [facility] Waveforms   EIA:   104  T   CFA:   97  T   PFA:   88  B   SFA Proximal:   69  B   SFA Mid:   88  B   SFA Distal:   84  B   Popliteal Artery:   61  B   PTA:   36   B   ADONIS:   33  B   DPA:   12  M   Waveforms: T=Triphasic, M=Monophasic, B=Biphasic     Impression:      Right Lower Extremity: Patent lower extremity vasculature with multiphasic flow.  No focal stenosis identified.     Left Lower Extremity: Patent lower extremity vasculature with multiphasic flow.  No focal stenosis identified.     Reference:  Category Normal 1-19% 20-49% 50-99% Occluded   PSV <160 cm/sec without spectral broadening <160 cm/sec with spectral broadening Increased Increased Absent Flow   Ratio N/A N/A < 2.0 >2.0 N/A   Post-Stenotic Turbulence No No No Yes N/A       Labs:    I personally reviewed the following lab results today and those on care everywhere, if indicated    No results found for: SEDRATE      No results found for: CRP        Lab Results   Component Value Date    CREATININE 0.84 09/28/2010      No results found for: HGBA1C        Lab Results   Component Value Date    BUN 18 09/28/2010              No results found for: LABPROT, ALBUMIN    No results found for: [id number]    No results found for: TSH  Lab Results   Component Value Date    WBC 5.8 09/21/2010    HGB 10.5 (L) 09/27/2010    HCT 46.7 09/21/2010     MCV 89 09/21/2010     (L) 09/28/2010     5/3/2019 TSH 1.39 magnesium 2.5 AST 16 ALT 14 total protein 6.7 albumin 3.9 bilirubin total 1.7 alk phos 61 BUN 22 creatinine 0.96 GFR greater than 60 hemoglobin A1c 6 PSA 3    10/9/20  BUN 25 Cr 0.94  GFR > 60 Glu 102 HgbA1c 6.1  WBC 6.9  Hgb 15.1  Plat 146  Chol 124  trigly 51  HDL 56  LDL  58  ALT 17  PSA 4.4    Physical Exam:  Vitals:    10/19/20 1104   BP: 104/70   Pulse: (!) 58   Resp: 20   Temp: 98  F (36.7  C)      BMI 22.67 weight 160 (+3) pounds    Circumferential measures:    Vasc Edema 5/19/2016 1/9/2017 8/19/2019 12/19/2019 10/19/2020   Right just above MTP  24.5 23.1 23.7 23.3 23.4   Right Ankle 24 21.4 21.7 20.9 21   Right Widest Calf 36.5 37.2 34.6 33.4 34.2   Right Thigh Up 10cm 42.5 43.3 39.8 39.8 -   Left - just above MTP 28 22.8 24 23.5 23.2   Left Ankle 23 21.6 21.5 20.8 21   Left Widest Calf 37 36.1 35 33.6 33.3   Left Thigh Up 10cm 42.5 41.3 40.6 40 -     Circumferential numbers side to side stable.    General:  85 y.o. male in no apparent distress.     Psych: Alert and oriented x 3.  Cooperative. Affect slightly depressed.     HEENT:  Atraumatic and normocephalic.    Abdominal:  Normal bowel sounds.  No masses, tenderness, guarding or rigidity.  No inguinal lymphadenopathy or fullness palpated.      Musculoskeletal:  Normal range of motion in knees and ankles bilaterally.  There is no active joint synovitis, erythema, swelling or joint laxity.     Neurological:  Sensation is intact to pinprick and light touch in both legs.  Strength testing is normal in hip flexion, knee flexion, knee extension, ankle dorsiflexion and great toe extension bilaterally.      Vascular: Foot pulses hard to palpate. There are no significant telangietasias, medial ankle venous flares, venous varicosities  and spider veins. Dependent rubor continues.     Integumentary: Skin of the legs is uniformly warm and dry.  Nails are thickened and discolored.  No open  ulcerations.    Tennille Severino MD, Founding Diplomate ABCAROL, FACCWS, FAAPMR  Medical Director Wound Care and Lymphedema  [facility]  [phone]

## 2021-06-30 ENCOUNTER — RECORDS - HEALTHEAST (OUTPATIENT)
Dept: LAB | Facility: CLINIC | Age: 86
End: 2021-06-30

## 2021-07-01 LAB
ANION GAP SERPL CALCULATED.3IONS-SCNC: 9 MMOL/L (ref 5–18)
BUN SERPL-MCNC: 49 MG/DL (ref 8–28)
CALCIUM SERPL-MCNC: 8.1 MG/DL (ref 8.5–10.5)
CHLORIDE BLD-SCNC: 113 MMOL/L (ref 98–107)
CO2 SERPL-SCNC: 24 MMOL/L (ref 22–31)
CREAT SERPL-MCNC: 0.77 MG/DL (ref 0.7–1.3)
GFR SERPL CREATININE-BSD FRML MDRD: >60 ML/MIN/1.73M2
GLUCOSE BLD-MCNC: 186 MG/DL (ref 70–125)
POTASSIUM BLD-SCNC: 4.2 MMOL/L (ref 3.5–5)
SODIUM SERPL-SCNC: 146 MMOL/L (ref 136–145)

## 2021-09-19 ENCOUNTER — HEALTH MAINTENANCE LETTER (OUTPATIENT)
Age: 86
End: 2021-09-19

## 2022-05-01 ENCOUNTER — HEALTH MAINTENANCE LETTER (OUTPATIENT)
Age: 87
End: 2022-05-01

## 2022-11-21 ENCOUNTER — HEALTH MAINTENANCE LETTER (OUTPATIENT)
Age: 87
End: 2022-11-21

## 2023-07-09 ENCOUNTER — HEALTH MAINTENANCE LETTER (OUTPATIENT)
Age: 88
End: 2023-07-09